# Patient Record
Sex: FEMALE | Race: WHITE | NOT HISPANIC OR LATINO | Employment: PART TIME | URBAN - METROPOLITAN AREA
[De-identification: names, ages, dates, MRNs, and addresses within clinical notes are randomized per-mention and may not be internally consistent; named-entity substitution may affect disease eponyms.]

---

## 2020-07-17 ENCOUNTER — TRANSFERRED RECORDS (OUTPATIENT)
Dept: HEALTH INFORMATION MANAGEMENT | Facility: CLINIC | Age: 25
End: 2020-07-17

## 2020-07-17 LAB
C TRACH DNA SPEC QL PROBE+SIG AMP: NOT DETECTED
HIV 1&2 EXT: NORMAL
N GONORRHOEA DNA SPEC QL PROBE+SIG AMP: NOT DETECTED
SPECIMEN DESCRIP: NORMAL
SPECIMEN DESCRIPTION: NORMAL

## 2021-05-13 ENCOUNTER — OFFICE VISIT (OUTPATIENT)
Dept: INTERNAL MEDICINE | Facility: CLINIC | Age: 26
End: 2021-05-13
Payer: COMMERCIAL

## 2021-05-13 VITALS
HEIGHT: 64 IN | DIASTOLIC BLOOD PRESSURE: 76 MMHG | TEMPERATURE: 98.1 F | BODY MASS INDEX: 24.19 KG/M2 | SYSTOLIC BLOOD PRESSURE: 118 MMHG | HEART RATE: 114 BPM | OXYGEN SATURATION: 96 % | RESPIRATION RATE: 22 BRPM | WEIGHT: 141.7 LBS

## 2021-05-13 DIAGNOSIS — G40.909 SEIZURE DISORDER (H): ICD-10-CM

## 2021-05-13 DIAGNOSIS — K21.9 GASTROESOPHAGEAL REFLUX DISEASE WITHOUT ESOPHAGITIS: ICD-10-CM

## 2021-05-13 DIAGNOSIS — Z79.899 CONTROLLED SUBSTANCE AGREEMENT SIGNED: ICD-10-CM

## 2021-05-13 DIAGNOSIS — F90.0 ATTENTION DEFICIT HYPERACTIVITY DISORDER (ADHD), PREDOMINANTLY INATTENTIVE TYPE: Primary | ICD-10-CM

## 2021-05-13 PROCEDURE — 80307 DRUG TEST PRSMV CHEM ANLYZR: CPT | Performed by: NURSE PRACTITIONER

## 2021-05-13 PROCEDURE — 99204 OFFICE O/P NEW MOD 45 MIN: CPT | Performed by: NURSE PRACTITIONER

## 2021-05-13 RX ORDER — ATOMOXETINE 25 MG/1
25 CAPSULE ORAL DAILY
COMMUNITY
Start: 2021-05-12 | End: 2021-09-20

## 2021-05-13 RX ORDER — LAMOTRIGINE 100 MG/1
100 TABLET ORAL 2 TIMES DAILY
COMMUNITY
Start: 2021-03-18 | End: 2021-12-02

## 2021-05-13 RX ORDER — METHYLPHENIDATE HYDROCHLORIDE 18 MG/1
TABLET ORAL
COMMUNITY
Start: 2021-04-05 | End: 2021-05-13

## 2021-05-13 RX ORDER — METHYLPHENIDATE HYDROCHLORIDE 18 MG/1
TABLET ORAL
Qty: 60 TABLET | Refills: 0 | Status: SHIPPED | OUTPATIENT
Start: 2021-05-13 | End: 2021-08-20

## 2021-05-13 SDOH — HEALTH STABILITY: MENTAL HEALTH: HOW OFTEN DO YOU HAVE 6 OR MORE DRINKS ON ONE OCCASION?: NEVER

## 2021-05-13 SDOH — HEALTH STABILITY: MENTAL HEALTH: HOW OFTEN DO YOU HAVE A DRINK CONTAINING ALCOHOL?: MONTHLY OR LESS

## 2021-05-13 SDOH — HEALTH STABILITY: MENTAL HEALTH: HOW MANY STANDARD DRINKS CONTAINING ALCOHOL DO YOU HAVE ON A TYPICAL DAY?: 1 OR 2

## 2021-05-13 ASSESSMENT — MIFFLIN-ST. JEOR: SCORE: 1372.75

## 2021-05-13 NOTE — PROGRESS NOTES
Assessment & Plan     Attention deficit hyperactivity disorder (ADHD), predominantly inattentive type  - Drug Confirmation Panel Urine with Creat (Care Map)  - methylphenidate HCl ER (CONCERTA) 18 MG CR tablet; TAKE 2 TABLETS BY MOUTH IN THE MORNING  - MENTAL HEALTH REFERRAL  - Adult; Psychiatry, Outpatient Treatment; Individual/Couples/Family/Group Therapy/Health Psychology; [facility]: Psychiatry Clinic [phone]; We will contact you to schedule the appointment or please call with any questions;...; Future  - also on Strattera    Controlled substance agreement signed  - Drug Confirmation Panel Urine with Creat (Care Map)  - methylphenidate HCl ER (CONCERTA) 18 MG CR tablet; TAKE 2 TABLETS BY MOUTH IN THE MORNING  - MENTAL HEALTH REFERRAL  - Adult; Psychiatry, Outpatient Treatment; Individual/Couples/Family/Group Therapy/Health Psychology; [facility]: Psychiatry Clinic [phone]; We will contact you to schedule the appointment or please call with any questions;...; Future    Seizure disorder (H)  - followed by Neurologist and on Lamictal  - MENTAL HEALTH REFERRAL  - Adult; Psychiatry, Outpatient Treatment; Individual/Couples/Family/Group Therapy/Health Psychology; [facility]: Psychiatry Clinic [phone]; We will contact you to schedule the appointment or please call with any questions;...; Future    Gastroesophageal reflux disease without esophagitis  - stable and taking Omeprazole prn; encouraged to trial over the counter Pepcid twice daily in place (Omeprazole must be taken routinely/daily    [id number]}     Patient Instructions   Go to [address] for urine lab    Refilled Concerta today; MUST get a copy of the medical records to include testing for ADHD    Referral to Mental Health psychiatrist and counselor    Signed controlled substance agreement (copy given to patient)    Follow up in 2 months for physical to include a pap and fasting blood work      Return in about 2 months (around 7/13/2021) for Routine preventive,  "with me, in person to include pap and fasting blood work.    Sol Talbot CNP  [facility] LORRIE Medrano is a 26 year old who presents for the following health issues  accompanied by her mother:    HPI     She wants to establish care with a primary provider and also need refill on her medications.     New patient to the clinic and provider.  Briefly reviewed PMH, SH, FH, and medications.  She moved up from [place], [place] with her mother and trying to establish with a provider for medication refills: today needs Concerta filled so she can continue to work; helps her focus and concentrate and has been without it for several days.  The clinic in [place] would not refill it due to her provider retiring.  Wants to see a psychiatrist and counselor to make sure she is on the correct medications.  Accompanied by mom.    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    Overall doing ok.  No fever or cough.  No shortness of breathe or chest pain.  No stomach or urination issues.  No joint pain.      Review of Systems   Constitutional, HEENT, cardiovascular, pulmonary, GI, , musculoskeletal, neuro, skin, endocrine and psych systems are negative, except as otherwise noted.      Objective    /76 (BP Location: Left arm, Patient Position: Sitting, Cuff Size: Adult Regular)   Pulse 114   Temp 98.1  F (36.7  C) (Oral)   Resp 22   Ht 1.626 m (5' 4\")   Wt 64.3 kg (141 lb 11.2 oz)   LMP 05/07/2021 (Exact Date)   SpO2 96%   BMI 24.32 kg/m    Body mass index is 24.32 kg/m .     Physical Exam   GENERAL: alert and no distress; accompanied by mom  RESP: lungs clear to auscultation - no rales, rhonchi or wheezes  CV: regular rate and rhythm, normal S1 S2, no S3 or S4, no murmur, click or rub, no peripheral edema and peripheral pulses strong  ABDOMEN: soft, nontender, no hepatosplenomegaly, no masses and bowel sounds normal  SKIN: no suspicious " lesions or rashes  PSYCH: mentation appears normal, affect normal and anxious

## 2021-05-13 NOTE — PATIENT INSTRUCTIONS
Go to [address] for urine lab    Refilled Concerta today; MUST get a copy of the medical records to include testing for ADHD    Referral to Mental Health psychiatrist and counselor    Signed controlled substance agreement (copy given to patient)    Follow up in 2 months for physical to include a pap and fasting blood work  
none

## 2021-05-14 ENCOUNTER — TRANSFERRED RECORDS (OUTPATIENT)
Dept: HEALTH INFORMATION MANAGEMENT | Facility: CLINIC | Age: 26
End: 2021-05-14

## 2021-05-18 LAB
AMPHET UR CFM-MCNC: 1960 NG/ML
AMPHET/CREAT UR: 961 NG/MG{CREAT}
CREAT UR-MCNC: 204 MG/DL
ME-PHENIDATE UR CFM-MCNC: 395 NG/ML
ME-PHENIDATE/CREAT UR: 194 NG/MG{CREAT}
RPT COMMENT: ABNORMAL

## 2021-05-19 ENCOUNTER — TELEPHONE (OUTPATIENT)
Dept: INTERNAL MEDICINE | Facility: CLINIC | Age: 26
End: 2021-05-19

## 2021-05-19 DIAGNOSIS — F90.0 ATTENTION DEFICIT HYPERACTIVITY DISORDER (ADHD), PREDOMINANTLY INATTENTIVE TYPE: Primary | ICD-10-CM

## 2021-05-19 NOTE — TELEPHONE ENCOUNTER
[facility].  See below  Kendra Flores RN, BSN        ----- Message from Sol Talbot CNP sent at 5/18/2021  8:41 PM CDT -----  Notify the patient.  Drug screen testing shows positive for Amphetamine and Methylphenidate which is what you are taking (shows high doses).  Please keep referral appointment with mental health specialist for further evaluation as that specialist will need to approve any future refills..  Still need medical documentation from previous provider for your diagnosis for ADHD.

## 2021-05-24 NOTE — TELEPHONE ENCOUNTER
Contacted patient and informed her of lab results and Shanique's recommendations. Patient states that the referral Shanique gave her to [facility] did not work out and they were not able to see her because they are too busy. She will need a new referral, she is also concerned about running out of medication before she can see a new psychiatrist.

## 2021-05-24 NOTE — TELEPHONE ENCOUNTER
I signed the referral for another psychiatry group for further evaluation of her ADHD medications (Strattera and Concerta).  Notify the patient.

## 2021-05-24 NOTE — TELEPHONE ENCOUNTER
Shanique requests this RN contact [facility] to clarify why patient was not able to be seen for an appointment.     Contacted [facility] ([phone]) and spoke to Myriam. She states they according to their notes they spoke to patient on 5/21 and the patient was going to call back to complete their phone screening. However, wait times are 5 months out for their department and they do tell patients this when they call (perhaps this is what patient was referring to). She states once the patient completes the phone screening, they would get her added to their wait list for cancellations to try to get them in sooner, but this is not guaranteed.      She states the Collaborative Care Service line usually has slightly shorter wait times of 3 months - this is a different option on the referral. Psychiatry referral pended for Collaborative Care if desired.

## 2021-05-25 NOTE — TELEPHONE ENCOUNTER
Attempted to contact patient. Left voice message to call back.     Please inform patient that Shanique placed different referral for her through [facility]. They will contact her to schedule an appointment.

## 2021-07-23 ENCOUNTER — VIRTUAL VISIT (OUTPATIENT)
Dept: PSYCHIATRY | Facility: CLINIC | Age: 26
End: 2021-07-23
Attending: NURSE PRACTITIONER
Payer: COMMERCIAL

## 2021-07-23 DIAGNOSIS — Z53.9 NO SHOW: Primary | ICD-10-CM

## 2021-07-23 NOTE — Clinical Note
Tatianna, in meeting with Nellie today she appeared quite suspicious.  She evaded answering certain personal questions regarding her background.  At this point I am going to continue her Concerta and atomoxetine as prescribed.  I hope to get additional information from her mother at some point as well as through other records.  We talked about decreasing her dose of Concerta in the future to see if this would decrease the intensity of her irritability.  Thank you for the referral.  Gely

## 2021-07-23 NOTE — PROGRESS NOTES
This video/telephone visit will be conducted via a call between you and your physician/provider. We have found that certain health care needs can be provided without the need for an in-person physical exam. This service lets us provide the care you need with a video /telephone conversation. If a prescription is necessary we can send it directly to your pharmacy. If lab work is needed we can place an order for that and you can then stop by our lab to have the test done at a later time.    Just as we bill insurance for in-person visits, we also bill insurance for video/telephone visits. If you have questions about your insurance coverage, we recommend that you speak with your insurance company.    Patient has given verbal consent for video/Telephone visit? Yes   Patient would like the video visit invitation sent by: Text to cell phone: [phone]  CONNIE/GENESIS NI CMA    Referral by: Sol Talbot CNP     Patient verified allergies, medications and pharmacy via phone. PHQ: 10 Not difficult and JAISON: 11 somewhat  done verbally with writer.   Patient states she is ready for visit.  [place]  to be [place] by provider.

## 2021-07-23 NOTE — PROGRESS NOTES
________________________________________  Medications Phoned  to Pharmacy [] yes []no  Name of Pharmacist:  List Medications, including dose, quantity and instructions    Medications ordered this visit were e-scribed.  Verified by order class [] yes  [x] no    Medication changes or discontinuations were communicated to patient's pharmacy: [] yes  [x] no    Dictation completed at time of chart check: [] yes  [x] no    I have checked the documentation for today s encounters and the above information has been reviewed and completed.

## 2021-07-23 NOTE — PATIENT INSTRUCTIONS
1.  Continue Concerta 36 mg daily with thoughts to decrease dosage dose at her next visit to minimize anxiety and irritability symptoms  2.  Continue atomoxetine 25 mg daily  3.  Can consider psychological testing to confirm diagnosis of ADHD       Continue all other medical directions per primary care provider.     Continue all other medications as reviewed per electronic medical record today.     Safety plan reviewed. To the Emergency Department as needed or call after hours crisis line at [phone] or [phone]. [place] Crisis Text Line: Text [place] to 687392  or  Suicide LifeLine Chat: suicideCompuMed.org/chat/    To schedule individual or family therapy, call [facility] at [phone].     Schedule an appointment with me in September 20 or sooner as needed.  Call [facility] at [phone] to schedule.    Follow up with primary care provider as planned or for acute medical concerns.    Call the psychiatric nurse line with medication questions or concerns at [phone].    RentHome.ruhart may be used to communicate with your provider, but this is not intended to be used for emergencies.     Crisis Resources:    National Suicide Prevention Lifeline: 373.330.6459 (TTY: 230.299.1799). Call anytime for help.  (www.suicidepreventionlifeline.org)  National [place] [employer] Mental Illness (www.dante.org): 529.382.3433 or 596-357-4779.   Mental Health Association (www.mentalhealth.org): 978.857.3770 or 986-487-3789.  [place] Crisis Text Line: Text [place] to 912475  Suicide LifeLine Chat: suicideCompuMed.org/chat

## 2021-08-16 ENCOUNTER — TELEPHONE (OUTPATIENT)
Dept: INTERNAL MEDICINE | Facility: CLINIC | Age: 26
End: 2021-08-16

## 2021-08-16 NOTE — TELEPHONE ENCOUNTER
Call received from patient. States she has been out of her Methylphenidate for 4 days. Patient has upcoming appointment 8/19 with Sol Talbot. Asking if there is anything she can do to help her concentrate until her appointment. Patient did have virtual visit with psychiatry on 7/23. Per 5/18 result note from Sol Alex CNP   5/18/2021  8:41 PM CDT       Notify the patient.  Drug screen testing shows positive for Amphetamine and Methylphenidate which is what you are taking (shows high doses).  Please keep referral appointment with mental health specialist for further evaluation as that specialist will need to approve any future refills..  Still need medical documentation from previous provider for your diagnosis for ADHD.     Patient states she is not sure what the plan was with psychiatry as far as further refills. States she is not sure if they were going to prescribe medications or if they were going to call her or what. Per psychiatry virtual visit note advised to    Call the psychiatric nurse line with medication questions or concerns at [phone].

## 2021-08-19 ENCOUNTER — VIRTUAL VISIT (OUTPATIENT)
Dept: INTERNAL MEDICINE | Facility: CLINIC | Age: 26
End: 2021-08-19
Payer: COMMERCIAL

## 2021-08-19 ENCOUNTER — TELEPHONE (OUTPATIENT)
Dept: PSYCHIATRY | Facility: CLINIC | Age: 26
End: 2021-08-19

## 2021-08-19 DIAGNOSIS — F90.0 ATTENTION DEFICIT HYPERACTIVITY DISORDER (ADHD), PREDOMINANTLY INATTENTIVE TYPE: ICD-10-CM

## 2021-08-19 DIAGNOSIS — Z79.899 CONTROLLED SUBSTANCE AGREEMENT SIGNED: ICD-10-CM

## 2021-08-19 PROCEDURE — 99213 OFFICE O/P EST LOW 20 MIN: CPT | Mod: GT | Performed by: NURSE PRACTITIONER

## 2021-08-19 RX ORDER — ATOMOXETINE 25 MG/1
25 CAPSULE ORAL DAILY
Status: CANCELLED | OUTPATIENT
Start: 2021-08-19

## 2021-08-19 RX ORDER — METHYLPHENIDATE HYDROCHLORIDE 18 MG/1
TABLET ORAL
Qty: 60 TABLET | Refills: 0 | Status: CANCELLED | OUTPATIENT
Start: 2021-08-19

## 2021-08-19 NOTE — PATIENT INSTRUCTIONS
Attention deficit hyperactivity disorder (ADHD), predominantly inattentive type  - referral was made to psychiatry and she saw provider:  ANGELICA Salas on 7/23/2021  - given number to patient for psychiatric nurse line with medication questions or concerns at [phone] to get refill and to schedule the upcoming appointment.  - I told her that once she is stabilized and referred back to me that then I would refill her medication but until then she needs to work with this provider

## 2021-08-19 NOTE — PROGRESS NOTES
Marcela is a 26 year old who is being evaluated via a billable video visit.      How would you like to obtain your AVS? Mail a copy  If the video visit is dropped, the invitation should be resent by: Text to cell phone: [phone]  Will anyone else be joining your video visit? No      Video Start Time: 5:14 PM    Assessment & Plan     Attention deficit hyperactivity disorder (ADHD), predominantly inattentive type  - referral was made to psychiatry and she saw provider:  ANGELICA Salas on 7/23/2021  - given number to patient for psychiatric nurse line with medication questions or concerns at [phone] to get refill and to schedule the upcoming appointment.  - I told her that once she is stabilized and referred back to me that then I would refill her medication but until then she needs to work with this provider; she seemed to understand      0956}     Patient instructions:  See above.    Return in about 4 weeks (around 9/16/2021), or if symptoms worsen or fail to improve.    Sol Talbot [facility]   Marcela is a 26 year old who presents for the following health issues     HPI     Medication Followup of Concerta 18 MG    Taking Medication as prescribed: yes    Side Effects:  None    Medication Helping Symptoms:  yes     She has been out of this medication for a week and without the medication she is not doing well. She needs a refill.    See above.  Patient was referred to Psychiatry and counseling for further evaluation of her ADHD.  Was seen by ANGELICA Salas on 7/23/2021 and reviewed note.  Patient was to follow up in 2 months with them until medications are stabilized.    Poor concentration and focus since running out of medication.  States she could not find there phone # to reach out to request refills or schedule follow up appointment.    Review of Systems   Constitutional, HEENT, cardiovascular, pulmonary, gi and gu systems are negative,  except as otherwise noted.      Objective           Vitals:  No vitals were obtained today due to virtual visit.    Physical Exam   GENERAL: alert and no distress  EYES: Eyes grossly normal to inspection.  No discharge or erythema, or obvious scleral/conjunctival abnormalities.  RESP: No audible wheeze, cough, or visible cyanosis.  No visible retractions or increased work of breathing.    SKIN: Visible skin clear. No significant rash, abnormal pigmentation or lesions.  NEURO: Cranial nerves grossly intact.  Mentation and speech appropriate for age.  PSYCH: Affect: poor focus and concentration; had to repeat many times the provider's name and number of psychiatry she was to reach out to.            Video-Visit Details    Type of service:  Video Visit    Video End Time:5:27 PM    Originating Location (pt. Location): Home    Distant Location (provider location):  [facility]     Platform used for Video Visit: Christi

## 2021-08-20 RX ORDER — METHYLPHENIDATE HYDROCHLORIDE 18 MG/1
TABLET ORAL
Qty: 60 TABLET | Refills: 0 | Status: SHIPPED | OUTPATIENT
Start: 2021-08-20 | End: 2021-09-20

## 2021-08-20 NOTE — TELEPHONE ENCOUNTER
Date of Last Office Visit: 07/23/2021  Date of Next Office Visit: 09/20/2021  No shows since last visit: 0  Cancellations since last visit: 0    Medication requested: methylphenidate HCL ER (Concerta) 18mg  Date last ordered: 05/13/2021 Qty: 60 Refills: 0     Review of [place] ?: no - unable to access provider as supervisor delegate not approved yet.  Medication last filled date: 05/13/2021 Qty filled: 05/13/2021  Other controlled substance on [place] ?: unknown  If yes, is this a new medication?: n/a  If yes, name of medication: n/a and date filled: n/a    Lapse in medication adherence greater than 5 days?: yes based on date of last order  If yes, call patient and gather details: called pt  Medication refill request verified as identical to current order?: yes  Result of Last DAM, VPA, Li+ Level, CBC, or Carbamazepine Level (at or since last visit): N/A    []Medication refilled per  Medication Refill in Ambulatory Care  policy.  [x]Medication unable to be refilled by RN due to criteria not met as indicated below:    []Eligibility - not seen in the last year   []Supervision - no future appointment   []Compliance - no shows, cancellations or lapse in therapy   []Verification - order discrepancy   [x]Controlled medication   []Medication not included in policy   []90-day supply request   []Other    Provider Note from 7/23/2021 apt:  1.  Continue Concerta 36 mg daily with thoughts to decrease dosage dose at her next visit to minimize anxiety and irritability symptoms

## 2021-08-20 NOTE — TELEPHONE ENCOUNTER
Reason for call:  Medication     If this is a refill request, has the caller requested the refill from the pharmacy already? Yes     Will the patient be using a [facility]? No     Name of the pharmacy and phone number for the current request:   [id number] - [place], [place] - [address] [place] [address] [address] AT [facility] [address] [facility] [address]  [phone]    Name of the medication requested: Concerta    Other request: Pt states that she hasn't had the med for 2 weeks because she couldn't reach us. (okay?) Pt has a follow-up appt scheduled on 9/20 Provided our direct number.     Phone number to reach patient:  Home number on file [phone] (home)    Best Time:  ASAP    Can we leave a detailed message on this number?  YES    Travel screening: Not Applicable

## 2021-08-20 NOTE — TELEPHONE ENCOUNTER
This RN called pt to update that the provider sent the order to [facility] in [place] this morning. She verbalized understanding.    Al Hutson RN on 8/20/2021 at 10:28 AM

## 2021-10-22 ENCOUNTER — VIRTUAL VISIT (OUTPATIENT)
Dept: PSYCHIATRY | Facility: CLINIC | Age: 26
End: 2021-10-22
Payer: COMMERCIAL

## 2021-10-22 DIAGNOSIS — F31.61 BIPOLAR DISORDER, CURRENT EPISODE MIXED, MILD (H): Primary | ICD-10-CM

## 2021-10-22 DIAGNOSIS — F90.0 ATTENTION DEFICIT HYPERACTIVITY DISORDER (ADHD), PREDOMINANTLY INATTENTIVE TYPE: ICD-10-CM

## 2021-10-22 DIAGNOSIS — F41.1 GAD (GENERALIZED ANXIETY DISORDER): ICD-10-CM

## 2021-10-22 PROCEDURE — 99207 PR CDG-CODE INCORRECT PER BILLING BASED ON TIME: CPT | Performed by: NURSE PRACTITIONER

## 2021-10-22 PROCEDURE — 99215 OFFICE O/P EST HI 40 MIN: CPT | Mod: TEL | Performed by: NURSE PRACTITIONER

## 2021-10-22 RX ORDER — METHYLPHENIDATE HYDROCHLORIDE 18 MG/1
TABLET ORAL
Qty: 60 TABLET | Refills: 0 | Status: SHIPPED | OUTPATIENT
Start: 2021-10-22 | End: 2021-11-24

## 2021-10-22 NOTE — PROGRESS NOTES
"Marcela is a 26 year old who is being evaluated via a billable telephone visit.      What phone number would you like to be contacted at? [phone]  How would you like to obtain your AVS? Olga  Phone call duration: 30 minutes        Outpatient Psychiatric Progress Note    Name: Marcela Harris   : 1995                    Primary Care Provider: Sol Talbot CNP   Therapist: None    PHQ-9 scores:  PHQ-9 SCORE 2021   PHQ-9 Total Score 10 3       JAISON-7 scores:  JAISON-7 SCORE 2021   Total Score 11 1       Patient Identification:    Patient is a 26 year old year old, single  White  female  who presents for return visit with me.  Patient is currently employed part time. Patient attended the session alone. Patient prefers to be called: \" Loreta\".      Current medications include: atomoxetine (STRATTERA) 25 MG capsule, Take 1 capsule (25 mg) by mouth daily  etonogestrel (NEXPLANON) 68 MG IMPL, 1 each by Subdermal route once  lamoTRIgine (LAMICTAL) 100 MG tablet, Take 100 mg by mouth 2 times daily  methylphenidate HCl ER (CONCERTA) 18 MG CR tablet, TAKE 2 TABLETS BY MOUTH IN THE MORNING - Fill 28 days after last refill    No current facility-administered medications on file prior to visit.       The [place] Prescription Monitoring Program has been reviewed and there are no concerns about diversionary activity for controlled substances at this time.      I was able to review most recent Primary Care Provider, specialty provider, and therapy visit notes that I have access to.     Today, patient reports that she is at work.. 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  She feels better at organizing.   She has found ways to manage her anxiety.  She denies having a lot of depression thoughts.  Yesterday s he ran out of her  Strattera and feels like that the medication  Is more helpful in managing her behavior to goof off less at work.  She " denies anger outbursts and feels like the episodes are not as bad as in the past.  She has not been fighting with her parents.  When she took two  Atomoxetine unintentionally, she felt her heart racing.  She did a hypnosis on her phone to get her on a schedule.  She recently moved to [place] and has no PCP established yet.     has a past medical history of Attention deficit hyperactivity disorder (ADHD), predominantly inattentive type, Contraceptive management, Gastroesophageal reflux disease without esophagitis, and Seizure disorder (H) (2000).    Social history updates:    She lives with her parents and her two sisters.  She is planning to see her grandmother this weekend.   She is starting read again.  Marcela likes to color.      Substance use updates:    No alcohol use reported.  Tobacco use: No    Vital Signs:   There were no vitals taken for this visit.    Labs:    Most recent laboratory results reviewed and no new labs.     Review of Systems:  10 systems (general, cardiovascular, respiratory, eyes, ENT, endocrine, GI, , M/S, neurological) were reviewed. Most pertinent finding(s) is/are: no chest pain reported, no stomach pain as long as she eats food with her medication, no headaches. The remaining systems are all unremarkable.    Mental Status Examination:  Appearance:  awake, alert  Attitude:  cooperative   Eye Contact:  unable to assess  Gait and Station: No assistive Devices used and No dizziness or falls  Psychomotor Behavior:  unable to assess  Oriented to:  time, person, and place  Attention Span and Concentration:  Normal  Speech:   clear, coherent and Speaks: English  Mood:  anxious and depressed  Affect:  mood congruent  Associations:  no loose associations  Thought Process:  goal oriented  Thought Content:  no evidence of suicidal ideation or homicidal ideation, no auditory hallucinations present and no visual hallucinations present  Recent and Remote Memory:  intact Not formally assessed. No  amnesia.  Fund of Knowledge: appropriate  Insight:  good  Judgment:  intact  Impulse Control:  intact    Suicide Risk Assessment:  Today Marcela Harris reports that she is having no thoughts to want to end her life or to harm other people. In addition, there are notable risk factors for self-harm, including single status, anxiety and mood change. However, risk is mitigated by commitment to family, history of seeking help when needed, future oriented, denies suicidal intent or plan and denies homicidal ideation, intent, or plan. Therefore, based on all available evidence including the factors cited above, Marcela Harris does not appear to be at imminent risk for self-harm, does not meet criteria for a 72-hr hold, and therefore remains appropriate for ongoing outpatient level of care.  A thorough assessment of risk factors related to suicide and self-harm have been reviewed and are noted above. The patient convincingly denies suicidality on several occasions. Local community safety resources printed and reviewed for patient to use if needed. There was no deceit detected, and the patient presented in a manner that was believable.     DSM5 Diagnosis:  Attention-Deficit/Hyperactivity Disorder  314.00 (F90.0) Predominantly inattentive presentation  296.89 Bipolar II Disorder With mixed features and mild  300.02 (F41.1) Generalized Anxiety Disorder    Medical comorbidities include:   Patient Active Problem List    Diagnosis Date Noted     Attention deficit hyperactivity disorder (ADHD), predominantly inattentive type      Priority: Medium     Gastroesophageal reflux disease without esophagitis      Priority: Medium     Seizure disorder (H) 2000     Priority: Medium     TBI with brain tumor         Assessment:    Marcela Harris reported to me that 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  The Concerta is working well for her in addition to the Populis.  She desires no changes in these today.   Anxiety is manageable and her depression is manageable.  With regards to mood regulation, she feels more stable with less anger outbursts..  She reported no side effects from her medications such as tachycardia or chest pain.    Medication side effects and alternatives were reviewed. Health promotion activities recommended and reviewed today. All questions addressed. Education and counseling completed regarding risks and benefits of medications completed.    Treatment Plan:        1.  Continue Concerta 36 mg daily    2.  Continue Strattera 25 mg daily        Continue all other medications as reviewed per electronic medical record today.     Safety plan reviewed. To the Emergency Department as needed or call after hours crisis line at [phone] or [phone]. [place] Crisis [place]. Text [place] to 586041 or Suicide LifeLine Chat: [email or web address]/chat/    To schedule individual or family therapy, call [facility] at [phone].    Schedule an appointment with me in November 29 or sooner as needed. Call [facility] at [phone] to schedule.    Follow up with primary care provider as planned or for acute medical concerns.    Call the psychiatric nurse line with medication questions or concerns at [phone].    Ubalohart may be used to communicate with your provider, but this is not intended to be used for emergencies.    Crisis Resources:    National Suicide Prevention Lifeline: 525.674.6048 (TTY: 122.534.3684). Call anytime for help.  (www.suicidepreventionlifeline.org)  National [place] on Mental Illness (www.dante.org): 856.262.4835 or 605-609-8566.   Mental Health Association (www.mentalhealth.org): 198.253.8722 or 058-957-9059.  [place] Crisis [place]: Text [place] to 829460  Suicide LifeLine Chat: [email or web address]/chat    Administrative Billing:   Time spent with patient was 30 minutes and greater than 50% of time or 20 minutes was spent in  counseling and coordination of care regarding above diagnoses and treatment plan.     psychotherapy options.    Treatment Plan:    Patient Status:  Patient will continue to be seen for ongoing consultation and stabilization.    Signed:   BIJU Rebolledo-BC   Psychiatry

## 2021-10-22 NOTE — PATIENT INSTRUCTIONS
1.  Continue Concerta 36 mg daily    2.  Continue Strattera 25 mg daily        Continue all other medications as reviewed per electronic medical record today.     Safety plan reviewed. To the Emergency Department as needed or call after hours crisis line at [phone] or [phone]. [place] Crisis [place]. Text [place] to 859125 or Suicide LifeLine Chat: [email or web address]/chat/    To schedule individual or family therapy, call [facility] at [phone].    Schedule an appointment with me in November 29 or sooner as needed. Call [facility] at [phone] to schedule.    Follow up with primary care provider as planned or for acute medical concerns.    Call the psychiatric nurse line with medication questions or concerns at [phone].    [email or web address] may be used to communicate with your provider, but this is not intended to be used for emergencies.    Crisis Resources:    National Suicide Prevention Lifeline: 979.133.7950 (TTY: 576.722.7015). Call anytime for help.  (www.suicidepreventionlifeline.org)  National [place] on Mental Illness (www.dante.org): 954.596.5091 or 459-221-0237.   Mental Health Association (www.mentalhealth.org): 174.332.4988 or 785-919-4495.  [place] Crisis [place]: Text [place] to 646209  Suicide LifeLine Chat: suicidePharmRight Corp.org/chat

## 2021-12-02 ENCOUNTER — VIRTUAL VISIT (OUTPATIENT)
Dept: PSYCHIATRY | Facility: CLINIC | Age: 26
End: 2021-12-02
Payer: COMMERCIAL

## 2021-12-02 DIAGNOSIS — F31.61 BIPOLAR DISORDER, CURRENT EPISODE MIXED, MILD (H): Primary | ICD-10-CM

## 2021-12-02 DIAGNOSIS — F90.0 ATTENTION DEFICIT HYPERACTIVITY DISORDER (ADHD), PREDOMINANTLY INATTENTIVE TYPE: ICD-10-CM

## 2021-12-02 PROCEDURE — 99214 OFFICE O/P EST MOD 30 MIN: CPT | Mod: TEL | Performed by: NURSE PRACTITIONER

## 2021-12-02 RX ORDER — LAMOTRIGINE 100 MG/1
100 TABLET ORAL 2 TIMES DAILY
Qty: 30 TABLET | Refills: 3 | Status: SHIPPED | OUTPATIENT
Start: 2021-12-02 | End: 2022-08-10

## 2021-12-02 RX ORDER — METHYLPHENIDATE HYDROCHLORIDE 18 MG/1
TABLET ORAL
Qty: 60 TABLET | Refills: 0 | Status: SHIPPED | OUTPATIENT
Start: 2021-12-02 | End: 2022-01-06

## 2021-12-02 RX ORDER — ATOMOXETINE 25 MG/1
25 CAPSULE ORAL DAILY
Qty: 30 CAPSULE | Refills: 3 | Status: SHIPPED | OUTPATIENT
Start: 2021-12-02 | End: 2022-01-06

## 2021-12-02 NOTE — PROGRESS NOTES
"Marcela is a 26 year old who is being evaluated via a billable telephone visit.      What phone number would you like to be contacted at? [phone]  How would you like to obtain your AVS? Mail a copy        Outpatient Psychiatric Progress Note    Name: Marcela Harris   : 1995                    Primary Care Provider: Sol Talbot CNP   Therapist: None    PHQ-9 scores:  PHQ-9 SCORE 2021   PHQ-9 Total Score 10 3       JAISON-7 scores:  JAISON-7 SCORE 2021   Total Score 11 1       Patient Identification:    Patient is a 27 year old year old, single  White  female  who presents for return visit with me.  Patient is currently employed part time. Patient attended the session alone. Patient prefers to be called: \" Loreta\".    Current medications include: etonogestrel (NEXPLANON) 68 MG IMPL, 1 each by Subdermal route once    No current facility-administered medications on file prior to visit.       The [place] Prescription Monitoring Program has been reviewed and there are no concerns about diversionary activity for controlled substances at this time.      I was able to review most recent Primary Care Provider, specialty provider, and therapy visit notes that I have access to.     Today, patient reports that she has been off of her medications for 2 weeks. Since that time she has been experiencing mood swings ranging from extreme happiness to extreme sadness. People are getting upset with her both at work and at home. Throughout all this her sleep is going well. Other mood swing behaviors include extreme negative thinking. At this point she is taking atomoxetine, methylphenidate, and Lamictal. Yesterday she told me she lost her bottle of Lamictal.     has a past medical history of Attention deficit hyperactivity disorder (ADHD), predominantly inattentive type, Contraceptive management, Gastroesophageal reflux disease without esophagitis, and Seizure disorder (H) ().    Social " history updates:    Loreta lives with family. She has financial stress.    Substance use updates:    No alcohol use reported  Tobacco use: No    Vital Signs:   There were no vitals taken for this visit.    Labs:    Most recent laboratory results reviewed and no new labs.     Mental Status Examination:  Appearance:  awake, alert and mild distress  Attitude:  cooperative   Eye Contact:  Unable to assess  Gait and Station: No assistive Devices used and No dizziness or falls  Psychomotor Behavior:  Unable to assess  Oriented to:  time, person, and place  Attention Span and Concentration:  Fair  Speech:   pressured speech and Speaks: English  Mood:  anxious  Affect:  intensity is heightened  Associations:  no loose associations  Thought Process:  goal oriented  Thought Content:  no evidence of suicidal ideation or homicidal ideation, no auditory hallucinations present and no visual hallucinations present  Recent and Remote Memory:  intact Not formally assessed. No amnesia.  Fund of Knowledge: appropriate  Insight:  good  Judgment:  intact  Impulse Control:  fair    Suicide Risk Assessment:  Today Marcela Harris reports that she is having no thoughts to want to end her life or to harm other people. In addition, there are notable risk factors for self-harm, including single status, anxiety and mood change. However, risk is mitigated by commitment to family, history of seeking help when needed, future oriented, denies suicidal intent or plan and denies homicidal ideation, intent, or plan. Therefore, based on all available evidence including the factors cited above, Marcela Harris does not appear to be at imminent risk for self-harm, does not meet criteria for a 72-hr hold, and therefore remains appropriate for ongoing outpatient level of care.  A thorough assessment of risk factors related to suicide and self-harm have been reviewed and are noted above. The patient convincingly denies suicidality on several occasions. Local  community safety resources printed and reviewed for patient to use if needed. There was no deceit detected, and the patient presented in a manner that was believable.     DSM5 Diagnosis:  Attention-Deficit/Hyperactivity Disorder  314.00 (F90.0) Predominantly inattentive presentation  296.89 Bipolar II Disorder With mixed features and moderate    Medical comorbidities include:   Patient Active Problem List    Diagnosis Date Noted     Attention deficit hyperactivity disorder (ADHD), predominantly inattentive type      Priority: Medium     Gastroesophageal reflux disease without esophagitis      Priority: Medium     Seizure disorder (H) 2000     Priority: Medium     TBI with brain tumor         Assessment:    Marcela Harris reported being off of her medications for the past 2 weeks. Since that time she has experienced extreme mood swings from extreme happiness to extreme sadness and negative thinking. People around her at home and at work have noticed the change in her demeanor.. Lamictal will continue at 100 mg twice daily. She has not missed any doses of that, according to her as she just misplaced the bottle yesterday. Atomoxetine will be restarted at 25 mg daily. Concerta will be restarted at 36 mg daily    Medication side effects and alternatives were reviewed. Health promotion activities recommended and reviewed today. All questions addressed. Education and counseling completed regarding risks and benefits of medications and psychotherapy options.    Treatment Plan:  [email or web address]        1. Lamotrigine 100 mg twice daily    2. Concerta 36 mg daily    3. Atomoxetine 25 mg daily        Continue all other medications as reviewed per electronic medical record today.     Safety plan reviewed. To the Emergency Department as needed or call after hours crisis line at [phone] or [phone]. [place] Crisis Text [place]. Text [place] to 673264 or Suicide LifeLine Chat: suicidepreventionlifeline.org/chat/    To  schedule individual or family therapy, call [facility] at [phone].    Schedule an appointment with me in 6 weeks or sooner as needed. Call [facility] at [phone] to schedule.    Follow up with primary care provider as planned or for acute medical concerns.    Call the psychiatric nurse line with medication questions or concerns at [phone].    MyChart may be used to communicate with your provider, but this is not intended to be used for emergencies.    Crisis Resources:    National Suicide Prevention Lifeline: 119.800.8588 (TTY: 703.285.9411). Call anytime for help.  (www.suicidepreventionlifeline.org)  National [place] on Mental Illness (www.dante.org): 424.371.9999 or 827-586-0404.   Mental Health Association (www.mentalhealth.org): 650.724.5859 or 162-883-5771.  [place] Crisis [place]: Text [place] to 674028  Suicide LifeLine Chat: suicidepreve  Administrative Billing:   Time spent with patient was 30 minutes and greater than 50% of time or 20 minutes was spent in counseling and coordination of care regarding above diagnoses and treatment plan.    Patient Status:  Patient will continue to be seen for ongoing consultation and stabilization.    Signed:   BIJU Rebolledo-BC   Psychiatry

## 2022-01-06 ENCOUNTER — VIRTUAL VISIT (OUTPATIENT)
Dept: INTERNAL MEDICINE | Facility: CLINIC | Age: 27
End: 2022-01-06
Payer: COMMERCIAL

## 2022-01-06 DIAGNOSIS — F31.61 BIPOLAR DISORDER, CURRENT EPISODE MIXED, MILD (H): ICD-10-CM

## 2022-01-06 DIAGNOSIS — F90.0 ATTENTION DEFICIT HYPERACTIVITY DISORDER (ADHD), PREDOMINANTLY INATTENTIVE TYPE: ICD-10-CM

## 2022-01-06 DIAGNOSIS — F90.0 ATTENTION DEFICIT HYPERACTIVITY DISORDER (ADHD), PREDOMINANTLY INATTENTIVE TYPE: Primary | ICD-10-CM

## 2022-01-06 PROCEDURE — 99213 OFFICE O/P EST LOW 20 MIN: CPT | Mod: GT | Performed by: NURSE PRACTITIONER

## 2022-01-06 RX ORDER — METHYLPHENIDATE HYDROCHLORIDE 18 MG/1
TABLET ORAL
Qty: 60 TABLET | Refills: 0 | Status: SHIPPED | OUTPATIENT
Start: 2022-01-06 | End: 2022-01-07

## 2022-01-06 RX ORDER — ATOMOXETINE 25 MG/1
25 CAPSULE ORAL DAILY
Qty: 30 CAPSULE | Refills: 3 | Status: SHIPPED | OUTPATIENT
Start: 2022-01-06 | End: 2022-06-21

## 2022-01-06 NOTE — PROGRESS NOTES
Marcela is a 26 year old who is being evaluated via a billable video visit.      How would you like to obtain your AVS? Mail a copy  If the video visit is dropped, the invitation should be resent by: Text to cell phone: [phone]  Will anyone else be joining your video visit? No    Video Start Time: 5:40 PM    Assessment & Plan     Attention deficit hyperactivity disorder (ADHD), predominantly inattentive type  Will fill x 1 and then per psych as I will not do 2 ADD medication and do not care for bipolar medication   - atomoxetine (STRATTERA) 25 MG capsule; Take 1 capsule (25 mg) by mouth daily  - methylphenidate HCl ER (CONCERTA) 18 MG CR tablet; TAKE 2 TABLETS BY MOUTH IN THE MORNING - Fill 28 days after last refill    Bipolar disorder, current episode mixed, mild (H)  See psych ongoing for medication         20 minutes spent on the date of the encounter doing chart review, history and exam, documentation and further activities per the note       See Patient Instructions  Patient Instructions   Call and make appointment with psych for medication   I do not do bipolar medication and would not do 2 medication for ADD long term   Filled x 1  Need to see psych long term for medication       Return in about 1 year (around 1/6/2023).    DONN Campos CNP  [facility]   Marcela is a 26 year old who presents for the following health issues     HPI recheck meds needs refills    She was seen in our clinic by Shanique Talbot who referred to psych, and she saw til now   Needs refill on straterra and concerta, which I will do once and then should be getting from psych ongoing   I do not usually treat bipolar, and do not give 2 ADD medication at one time   Will need psych ongoing for refills   Phone number given  For psych   Patient agreeable to this     Review of Systems   Constitutional, HEENT, cardiovascular, pulmonary, GI, , musculoskeletal, neuro, skin, endocrine and psych systems  are negative, except as otherwise noted.      Objective           Vitals:  No vitals were obtained today due to virtual visit.    Physical Exam   GENERAL: Healthy, alert and no distress  EYES: Eyes grossly normal to inspection.  No discharge or erythema, or obvious scleral/conjunctival abnormalities.  RESP: No audible wheeze, cough, or visible cyanosis.  No visible retractions or increased work of breathing.    SKIN: Visible skin clear. No significant rash, abnormal pigmentation or lesions.  NEURO: Cranial nerves grossly intact.  Mentation and speech appropriate for age.  PSYCH: Mentation appears normal, affect normal/bright, judgement and insight intact, normal speech and appearance well-groomed.                Video-Visit Details    Type of service:  Video Visit    Video End Time:5:53 PM    Originating Location (pt. Location): Home    Distant Location (provider location):  [facility]     Platform used for Video Visit: PhotoSolar

## 2022-01-06 NOTE — NURSING NOTE
"Chief Complaint   Patient presents with     Recheck Medication     initial There were no vitals taken for this visit. Estimated body mass index is 24.32 kg/m  as calculated from the following:    Height as of 5/13/21: 1.626 m (5' 4\").    Weight as of 5/13/21: 64.3 kg (141 lb 11.2 oz)..  bp completed using cuff size NA (Not Taken)  JACKY FERGUSON LPN  "

## 2022-01-06 NOTE — PATIENT INSTRUCTIONS
Call and make appointment with psych for medication   I do not do bipolar medication and would not do 2 medication for ADD long term   Filled x 1  Need to see psych long term for medication

## 2022-01-07 RX ORDER — METHYLPHENIDATE HYDROCHLORIDE 18 MG/1
TABLET ORAL
Qty: 60 TABLET | Refills: 0 | Status: SHIPPED | OUTPATIENT
Start: 2022-01-07 | End: 2022-02-08

## 2022-01-07 NOTE — TELEPHONE ENCOUNTER
Pt called regarding prescription, concerta. It was sent to the [facility] pharmacy and they are out of stock and she needs the prescription sent to another pharmacy.    Pharmacy: [address] [place] [address] [address] [facility]   PHONE : [phone]

## 2022-01-27 NOTE — PATIENT INSTRUCTIONS
1. Lamotrigine 100 mg twice daily    2. Concerta 36 mg daily    3. Atomoxetine 25 mg daily        Continue all other medications as reviewed per electronic medical record today.     Safety plan reviewed. To the Emergency Department as needed or call after hours crisis line at [phone] or [phone]. [place] Crisis Text [place]. Text [place] to 336252 or Suicide LifeLine Chat: suicidepreM-SIXline.org/chat/    To schedule individual or family therapy, call [facility] at [phone].    Schedule an appointment with me in 6 weeks or sooner as needed. Call [facility] at [phone] to schedule.    Follow up with primary care provider as planned or for acute medical concerns.    Call the psychiatric nurse line with medication questions or concerns at [phone].    C[employer] Technology may be used to communicate with your provider, but this is not intended to be used for emergencies.    Crisis Resources:    National Suicide Prevention Lifeline: 720.214.1548 (TTY: 189.987.4206). Call anytime for help.  (www.suicidepreventionlifeline.org)  National [place] on Mental Illness (www.dante.org): 319.115.3468 or 066-753-2837.   Mental Health Association (www.mentalhealth.org): 191.731.3376 or 541-939-1567.  [place] Crisis Text [place]: Text [place] to 736563  Suicide LifeLine Chat: suicidetino

## 2022-02-07 ENCOUNTER — TELEPHONE (OUTPATIENT)
Dept: PSYCHIATRY | Facility: CLINIC | Age: 27
End: 2022-02-07
Payer: COMMERCIAL

## 2022-02-07 DIAGNOSIS — F90.0 ATTENTION DEFICIT HYPERACTIVITY DISORDER (ADHD), PREDOMINANTLY INATTENTIVE TYPE: ICD-10-CM

## 2022-02-07 NOTE — TELEPHONE ENCOUNTER
Reason for call:  Other   Patient called regarding (reason for call): call back  Additional comments: pt requesting a callback, advised the next available appt is too far out (03/11/22).    Phone number to reach patient:  Home number on file [phone] (home)    Best Time:  Asap    Can we leave a detailed message on this number?  YES    Travel screening: Not Applicable

## 2022-02-07 NOTE — TELEPHONE ENCOUNTER
Phone call to Marcela. She is scheduled for an appt with Gely Salas 3/11/22. She initially said that she needed a refill on Lamictal through to appt, but then later said she had enough and needs a refill on her Concerta.     Concerta is prescribed by PCP. Encouraged patient to contact that office re: Concerta refill. Provided phone # for [facility] and suggested MyChart refill request. Marcela also mentioned she wants a referral to Neurology. Encouraged Marcela to discuss that with her PCP.    Encouraged Marcela to contact this office if she finds she will need a refill of Lamictal before 3/11/22 appt with Gely Salas.    Ana Erazo RN on 2/7/2022 at 4:02 PM

## 2022-02-08 RX ORDER — METHYLPHENIDATE HYDROCHLORIDE 18 MG/1
TABLET ORAL
Qty: 60 TABLET | Refills: 0 | Status: SHIPPED | OUTPATIENT
Start: 2022-02-08 | End: 2022-03-02

## 2022-02-13 ENCOUNTER — HEALTH MAINTENANCE LETTER (OUTPATIENT)
Age: 27
End: 2022-02-13

## 2022-02-24 ENCOUNTER — TELEPHONE (OUTPATIENT)
Dept: PSYCHIATRY | Facility: CLINIC | Age: 27
End: 2022-02-24

## 2022-02-24 DIAGNOSIS — F90.0 ATTENTION DEFICIT HYPERACTIVITY DISORDER (ADHD), PREDOMINANTLY INATTENTIVE TYPE: ICD-10-CM

## 2022-02-24 NOTE — TELEPHONE ENCOUNTER
2/24/22: Pt stated that she will be going on vacation and is concerned about not being able to refill her medication prior to leaving on 3/7/22. Pt was directed to contact her pharmacy regarding the medication and stated intent to do so. Would like a call back regarding if provider will refill her medication prior to appt in March so she will have it on vacation. Would like a call back regarding   Methylphenidate.

## 2022-02-25 RX ORDER — METHYLPHENIDATE HYDROCHLORIDE 18 MG/1
TABLET ORAL
Qty: 60 TABLET | Refills: 0 | Status: CANCELLED | OUTPATIENT
Start: 2022-02-25

## 2022-03-02 RX ORDER — METHYLPHENIDATE HYDROCHLORIDE 18 MG/1
TABLET ORAL
Qty: 60 TABLET | Refills: 0 | Status: SHIPPED | OUTPATIENT
Start: 2022-03-02 | End: 2022-03-23 | Stop reason: ALTCHOICE

## 2022-03-11 ENCOUNTER — VIRTUAL VISIT (OUTPATIENT)
Dept: PSYCHIATRY | Facility: CLINIC | Age: 27
End: 2022-03-11
Payer: COMMERCIAL

## 2022-03-11 DIAGNOSIS — Z53.9 NO SHOW: Primary | ICD-10-CM

## 2022-03-11 NOTE — PROGRESS NOTES
Patient no-showed today's appointment; provider notified for review of record, patient agrees to reschedule missed appointment - [place] of [place].

## 2022-03-16 ENCOUNTER — TELEPHONE (OUTPATIENT)
Dept: PSYCHIATRY | Facility: CLINIC | Age: 27
End: 2022-03-16
Payer: COMMERCIAL

## 2022-03-16 DIAGNOSIS — F90.0 ATTENTION DEFICIT HYPERACTIVITY DISORDER (ADHD), PREDOMINANTLY INATTENTIVE TYPE: ICD-10-CM

## 2022-03-16 RX ORDER — METHYLPHENIDATE HYDROCHLORIDE 18 MG/1
TABLET ORAL
Qty: 60 TABLET | Refills: 0 | OUTPATIENT
Start: 2022-03-16

## 2022-03-16 NOTE — TELEPHONE ENCOUNTER
Reason for call:  Medication   If this is a refill request, has the caller requested the refill from the pharmacy already? No  Will the patient be using a [facility]? No  Name of the pharmacy and phone number for the current request: [facility] #[id number] [place], [place] - [phone]    Name of the medication requested: Concerta     Other request: No    Phone number to reach patient:  Home number on file [phone] (home)    Best Time:  ASAP    Can we leave a detailed message on this number?  NO    Travel screening: Not Applicable

## 2022-03-16 NOTE — TELEPHONE ENCOUNTER
Please review script for COncerta, patient is requesting refill. Given on 30 pills to last for 28 days. Please send refill if appropriate and make changes as needed.   Disp Refills Start End VALERIE   methylphenidate HCl ER (CONCERTA) 18 MG CR tablet 60 tablet 0 3/2/2022  No   Sig: TAKE 2 TABLETS BY MOUTH IN THE MORNING - Fill 28 days after last refill   Sent to pharmacy as: Methylphenidate HCl ER 18 MG Oral Tablet Extended Release (CONCERTA)   Class: E-Prescribe   Earliest Fill Date: 3/2/2022   Order: [id number]   E-Prescribing Status: Receipt confirmed by pharmacy (3/2/2022 10:33 AM CST)

## 2022-03-22 NOTE — TELEPHONE ENCOUNTER
PRIOR AUTHORIZATION DENIED    Medication: methylphenidate HCl ER (CONCERTA) 18 MG CR tablet- DENIED     Denial Date: 3/22/2022    Denial Rational: Patient needs to use the higher available strength.      Appeal Information: If provider would like to appeal please provide a letter of medical necessity.

## 2022-03-22 NOTE — TELEPHONE ENCOUNTER
Central Prior Authorization Team  Phone: [phone]    PA Initiation    Medication: methylphenidate HCl ER (CONCERTA) 18 MG CR tablet  Insurance Company: [employer] - Phone [phone] Fax [phone]  Pharmacy Filling the Rx: [facility] #[id number] [place], [place] - [address] [place] [address] [address] [facility] [address] [facility]  Filling Pharmacy Phone: [phone]  Filling Pharmacy Fax:    Start Date: 3/22/2022

## 2022-03-22 NOTE — TELEPHONE ENCOUNTER
Reason for call:  Medication     If this is a refill request, has the caller requested the refill from the pharmacy already? Yes     Will the patient be using a [facility]? No     Name of the pharmacy and phone number for the current request:   [facility] #[id number] - [place], [place] - [address] [place] [address] [address] AT [facility] [address] [facility] [address]  [phone]    Name of the medication requested: Concerta    Other request: Pt stated that they need a prior auth sent to their insurance to fill this medication. Pt has new [employer] insurance that auth needs to be sent to along with their old insurance. Pt has been out of this med for 2 weeks.     Phone number to reach patient:  Home number on file [phone] (home)    Best Time:  ASAP    Can we leave a detailed message on this number?  YES    Travel screening: Not Applicable

## 2022-03-23 RX ORDER — METHYLPHENIDATE HYDROCHLORIDE 36 MG/1
36 TABLET ORAL EVERY MORNING
Qty: 15 TABLET | Refills: 0 | Status: SHIPPED | OUTPATIENT
Start: 2022-03-23 | End: 2022-04-05

## 2022-03-23 NOTE — TELEPHONE ENCOUNTER
Gely Salas, ANGELICA  Psych Rn - [facility] 48 minutes ago (10:05 AM)     [place]    She no showed for her March 11 visit so jailene you please contact her to see how she is  doing and  what  dose of  Concerta she was previously on?  I saw that it was 36 mg.  Thanks    Message text          I am still taking concerta and I haven't had it in couple weeks possibly about 2 weeks. I am at work and unsure of the dosage. RN gave patient intake number to call back and leave message with dosage for provider to do the appeal letter. Patient agreed to call back and leave message as [facility].

## 2022-03-23 NOTE — TELEPHONE ENCOUNTER
I ordered her 36  Mg tablet daily - 15 tabs to see if this will work for her instead of two 18 mg tabs daily.  Should not need PA for this.  (I hope!)

## 2022-03-23 NOTE — TELEPHONE ENCOUNTER
Gely Salas NP routed conversation to Psych Rn - Neeraj 2 minutes ago (11:03 AM)     Gely Salas NP 2 minutes ago (11:03 AM)     [place]       I ordered her 36  Mg tablet daily - 15 tabs to see if this will work for her instead of two 18 mg tabs daily.  Should not need PA for this.  (I hope!)        Called patient and informed her of the above. She will call back if needed.

## 2022-04-04 ENCOUNTER — TELEPHONE (OUTPATIENT)
Dept: BEHAVIORAL HEALTH | Facility: CLINIC | Age: 27
End: 2022-04-04
Payer: COMMERCIAL

## 2022-04-04 DIAGNOSIS — F90.0 ATTENTION DEFICIT HYPERACTIVITY DISORDER (ADHD), PREDOMINANTLY INATTENTIVE TYPE: ICD-10-CM

## 2022-04-04 NOTE — TELEPHONE ENCOUNTER
Reason for call:  Medication   If this is a refill request, has the caller requested the refill from the pharmacy already? Yes  Will the patient be using a [facility]? No  Name of the pharmacy and phone number for the current request: [facility]    Name of the medication requested: Concerta    Other request: Patient says she will not have enough to make it to her next appointment.    Phone number to reach patient:  Home number on file [phone] (home)    Best Time:  ASAP    Can we leave a detailed message on this number?  YES    Travel screening: Not Applicable

## 2022-04-05 RX ORDER — METHYLPHENIDATE HYDROCHLORIDE 36 MG/1
36 TABLET ORAL EVERY MORNING
Qty: 15 TABLET | Refills: 0 | Status: SHIPPED | OUTPATIENT
Start: 2022-04-12 | End: 2022-04-14

## 2022-04-05 NOTE — TELEPHONE ENCOUNTER
Date of Last Office Visit: 12/2/2021  Date of Next Office Visit: 5/5/2022  No shows since last visit: 1  Cancellations since last visit: none    Medication requested: Concerta 36 mg CR tablet Date last ordered: 3/23/2022 Qty: 15 Refills: 0     Review of [place] ?: yes  Medication last filled date: 3/23/2022 Qty filled: 15  Other controlled substance on [place] ?: yes  If yes, is this a new medication?: no  If yes, name of medication: no and date filled: no    Lapse in medication adherence greater than 5 days?: no  If yes, call patient and gather details: no  Medication refill request verified as identical to current order?: yes  Result of Last DAM, VPA, Li+ Level, CBC, or Carbamazepine Level (at or since last visit): N/A    Last visit treatment plan: Treatment Plan:  [email or web address]          1. Lamotrigine 100 mg twice daily    2. Concerta 36 mg daily    3. Atomoxetine 25 mg daily         Continue all other medications as reviewed per electronic medical record today.     Safety plan reviewed. To the Emergency Department as needed or call after hours crisis line at [phone] or [phone]. [place] Crisis Text Line. Text [place] to 126340 or Suicide LifeLine Chat: suicidepreventionlifeline.org/chat/    To schedule individual or family therapy, call [facility] at [phone].    Schedule an appointment with me in 6 weeks or sooner as needed. Call [facility] at [phone] to schedule.    Follow up with primary care provider as planned or for acute medical concerns.    Call the psychiatric nurse line with medication questions or concerns at [phone].    MyChart may be used to communicate with your provider, but this is not intended to be used for emergencies      []Medication refilled per  Medication Refill in Ambulatory Care  policy.  [x]Medication unable to be refilled by RN due to criteria not met as indicated below:    []Eligibility - not seen in the last  year   []Supervision - no future appointment   []Compliance - no shows, cancellations or lapse in therapy   []Verification - order discrepancy   [x]Controlled medication   [x]Medication not included in policy   []90-day supply request   []Other

## 2022-04-06 NOTE — TELEPHONE ENCOUNTER
4/6/22: Pt called stating that she has two days left of her Concerta rx, contacted pharmacy but they cannot fill it w/out auth. Current pharmacy is [phone]; [address]. Pt can be reached at [phone], okay to [facility].

## 2022-04-07 NOTE — TELEPHONE ENCOUNTER
Dylon Hong -   This patient is requesting a refill of her Concerta 36 mg. It appears she had a NO SHOW with you 3/11/22. Also appears you have Concerta to be filled on 4/12/22. Her next appt is scheduled for 5/5/22.     Are you wanting her to wait for the 4/12/22 refill that you placed on 4/5/22?        Rebeca Wallace RN on 4/7/2022 at 7:24 AM

## 2022-04-13 ENCOUNTER — TELEPHONE (OUTPATIENT)
Dept: PSYCHIATRY | Facility: CLINIC | Age: 27
End: 2022-04-13
Payer: COMMERCIAL

## 2022-04-13 DIAGNOSIS — F90.0 ATTENTION DEFICIT HYPERACTIVITY DISORDER (ADHD), PREDOMINANTLY INATTENTIVE TYPE: ICD-10-CM

## 2022-04-13 NOTE — TELEPHONE ENCOUNTER
Reason for call:  Other   Patient called regarding (reason for call): prescription  Additional comments: Pt called about a prescription that was supposed to be filled for a month, but was actually filled for 15 days. Pt stated that she is going out of the country soon, and currently does not have enough of her medication to last her, until her next appointment. Pt would like to speak to someone from Gely's team in regards to this.    Phone number to reach patient:  Cell number on file:    Telephone Information:   Mobile [phone]       Best Time:  ASAP    Can we leave a detailed message on this number?  YES    Travel screening: Not Applicable

## 2022-04-13 NOTE — TELEPHONE ENCOUNTER
Called patient no answer, left voice message. Routing to provider for review. Patient wants full month of medication.

## 2022-04-14 RX ORDER — METHYLPHENIDATE HYDROCHLORIDE 36 MG/1
36 TABLET ORAL EVERY MORNING
Qty: 30 TABLET | Refills: 0 | Status: SHIPPED | OUTPATIENT
Start: 2022-04-14 | End: 2022-06-02

## 2022-04-19 NOTE — TELEPHONE ENCOUNTER
Pt called intake about this prescription. Pt reported that her pharmacy won't fill the prescription until the 27th and pt is leaving for vacation on the 26th. Pt is wondering if she can get that filled a day earlier. Pt can be reached back at [phone]. Ok to leave a detailed message if need be.

## 2022-04-22 NOTE — TELEPHONE ENCOUNTER
Gely Salas NP  Psych Rn - [facility] 16 minutes ago (1:38 PM)     [place]    How long is she going to be gone?  Then I can order enough medication until she gets back.  Thanks.  Bernice    Message text         Called patient, no answer, left voice message.

## 2022-06-21 ENCOUNTER — VIRTUAL VISIT (OUTPATIENT)
Dept: PSYCHIATRY | Facility: CLINIC | Age: 27
End: 2022-06-21
Payer: COMMERCIAL

## 2022-06-21 ENCOUNTER — VIRTUAL VISIT (OUTPATIENT)
Dept: BEHAVIORAL HEALTH | Facility: CLINIC | Age: 27
End: 2022-06-21
Payer: COMMERCIAL

## 2022-06-21 DIAGNOSIS — F31.61 BIPOLAR DISORDER, CURRENT EPISODE MIXED, MILD (H): Primary | ICD-10-CM

## 2022-06-21 DIAGNOSIS — F90.0 ATTENTION DEFICIT HYPERACTIVITY DISORDER (ADHD), PREDOMINANTLY INATTENTIVE TYPE: ICD-10-CM

## 2022-06-21 DIAGNOSIS — E55.9 VITAMIN D DEFICIENCY: ICD-10-CM

## 2022-06-21 DIAGNOSIS — F41.1 GENERALIZED ANXIETY DISORDER: ICD-10-CM

## 2022-06-21 PROCEDURE — 99214 OFFICE O/P EST MOD 30 MIN: CPT | Mod: 95 | Performed by: NURSE PRACTITIONER

## 2022-06-21 PROCEDURE — 90832 PSYTX W PT 30 MINUTES: CPT | Mod: 95 | Performed by: COUNSELOR

## 2022-06-21 RX ORDER — METHYLPHENIDATE HYDROCHLORIDE 36 MG/1
36 TABLET ORAL EVERY MORNING
Qty: 20 TABLET | Refills: 0 | Status: SHIPPED | OUTPATIENT
Start: 2022-06-21 | End: 2022-07-18

## 2022-06-21 NOTE — PROGRESS NOTES
"[facility] - Collaborative Care Psychiatry Service     2022      Behavioral Health Clinician Progress Note    Patient Name: Marcela Harris           Service Type:  Individual      Service Location:   MyChart / Email (patient reached)     Session Start Time: 1220pm  Session End Time: 1240pm      Session Length: 16 - 37      Attendees: Patient     Service Modality:  Phone Visit:      Provider verified identity through the following two step process.  Patient provided:  Patient  and Patient address    The patient has been notified of the following:      \"We have found that certain health care needs can be provided without the need for a face to face visit.  This service lets us provide the care you need with a phone conversation.       I will have full access to your [facility] medical record during this entire phone call.   I will be taking notes for your medical record.      Since this is like an office visit, we will bill your insurance company for this service.       There are potential benefits and risks of telephone visits (e.g. limits to patient confidentiality) that differ from in-person visits.?Confidentiality still applies for telephone services, and nobody will record the visit.  It is important to be in a quiet, private space that is free of distractions (including cell phone or other devices) during the visit.??      If during the course of the call I believe a telephone visit is not appropriate, you will not be charged for this service\"     Consent has been obtained for this service by care team member: Yes     Visit Activities (Refresh list every visit): [facility] Only    Diagnostic Assessment Date: 2021 Gely Salas   Treatment Plan Review Date: N/A, will be developed next meeting   See Flowsheets for today's PHQ-9 and JAISON-7 results  Previous PHQ-9:   PHQ-9 SCORE 2021   PHQ-9 Total Score 10 3     Previous JAISON-7:   JAISON-7 SCORE 2021   Total Score " 11 1       ADENIKE LEVEL:  No flowsheet data found.    DATA  Extended Session (60+ minutes): No  Interactive Complexity: No  Crisis: No  [facility] Patient: No    Treatment Objective(s) Addressed in This Session:  Target Behavior(s): heart rate, craving, worry, feeling down    Depressed Mood: Decrease frequency and intensity of feeling down, depressed, hopeless  Improve diet, appetite, mindful eating, and / or meal planning  Improve concentration, focus, and mindfulness in daily activities   Anxiety: will experience a reduction in anxiety, will develop more effective coping skills to manage anxiety symptoms, will develop healthy cognitive patterns and beliefs and will increase ability to function adaptively    Current Stressors / Issues:   update: Pt reports that they have been able to concentrate better. Pt states that they and their doctor are concerned about a rapid heart rate. Pt says that they will get really thirsty. Pt is wondering if it is the methylphenidate is the one causing the rapid heart rate. Pt states that their father has anxiety and depression disorder. Pt is trying to keep their schedule consistent. Pt had COVID and the breathing problem gave them a little scare. When medication wears off pt will get a craving and are trying to figure out if it is food.    Stressors: none  Side Effects: rapid heart rate, thirst  Mood: no mood swings   Appetite: unremarkable  Sleep: unremarkable  Pregnancy: No   Impulsive spending/spending sprees: shop on [employer] a lot   Suicidality: Pt denies   Self-harm: Pt denies  Substance Use: alcohol- none   Caffeine: soda rarely   Therapist: no referral, pt is wanting a therapist- referral made and info for [facility]   Interventions: [facility] made referral for therapy and sent therapy resources through My Chart  Medication Questions/Requests: side effects possibly, if can refill emergency refill          Progress on Treatment Objective(s) / Homework:  Satisfactory progress -  ACTION (Actively working towards change); Intervened by reinforcing change plan / affirming steps taken    Motivational Interviewing    MI Intervention: Co-Developed Goal: reduce depression and anxiety, Expressed Empathy/Understanding, Supported Autonomy, Collaboration, Evocation, Permission to raise concern or advise, Open-ended questions, Reflections: simple and complex, Change talk (evoked) and Reframe     Change Talk Expressed by the Patient: Need to change Committment to change Taking steps    Provider Response to Change Talk: E - Evoked more info from patient about behavior change, A - Affirmed patient's thoughts, decisions, or attempts at behavior change, R - Reflected patient's change talk and S - Summarized patient's change talk statements    Also provided psychoeducation about behavioral health condition, symptoms, and treatment options    Care Plan review completed: No    Medication Review:  No changes to current psychiatric medication(s)    Medication Compliance:  Yes    Changes in Health Issues:   Yes: pt had COVID    Chemical Use Review:   Substance Use: Chemical use reviewed, no active concerns identified      Tobacco Use: No current tobacco use.      Assessment: Current Emotional / Mental Status (status of significant symptoms):  Risk status (Self / Other harm or suicidal ideation)  Patient denies a history of suicidal ideation, suicide attempts, self-injurious behavior, homicidal ideation, homicidal behavior and and other safety concerns  Patient denies current fears or concerns for personal safety.  Patient denies current or recent suicidal ideation or behaviors.  Patient denies current or recent homicidal ideation or behaviors.  Patient denies current or recent self injurious behavior or ideation.  Patient denies other safety concerns.  A safety and risk management plan has not been developed at this time, however patient was encouraged to call [facility] / [facility] should there be a change in any  of these risk factors.    Appearance:   Unable to assess-telephone visit  Eye Contact:   Unable to assess-telephone visit  Psychomotor Behavior: Normal   Attitude:   Cooperative   Orientation:   All  Speech   Rate / Production: Normal    Volume:  Normal   Mood:    Normal  Affect:    Appropriate   Thought Content:  Clear   Thought Form:  Coherent  Logical   Insight:    Good     Diagnoses:  1. Bipolar disorder, current episode mixed, mild (H)    2. Generalized anxiety disorder    3. Attention deficit hyperactivity disorder (ADHD), predominantly inattentive type        Collateral Reports Completed:  Communicated with:   Communicated with JOHANNA RebolledoBC   [facility]    Plan: (Homework, other):  Patient was given information about behavioral services and encouraged to schedule a follow up appointment with the clinic [facility] in conjunction with CCPs appointment.  She was also given information about mental health symptoms and treatment options  and therapy resources.  CD Recommendations: No indications of CD issues.     ISAMAR Martines, [facility]  June 21, 2022

## 2022-06-21 NOTE — PROGRESS NOTES
"Marcela is a 27 year old who is being evaluated via a billable telephonevisit.      How would you like to obtain your AVS? MyChart  If the video visit is dropped, the invitation should be resent by: Text to cell phone: [phone]  Will anyone else be joining your video visit? No    Priscila Goldman VF    Video-Visit Details    Video Start Time: 1:08 PM    Type of service:  Telephone Visit    Video End Time:1:37 PM    Originating Location (pt. Location): Other car    Distant Location (provider location):  [facility] & [facility]     Platform used for Video Visit: [facility]              Outpatient Psychiatric Progress Note    Name: Marcela Harris   : 1995                    Primary Care Provider: Sol Talbot CNP   Therapist: None    PHQ-9 scores:  PHQ-9 SCORE 2021   PHQ-9 Total Score 10 3       JAISON-7 scores:  JAISON-7 SCORE 2021   Total Score 11 1       Patient Identification:    Patient is a 27 year old year old, single  White Not  or  female  who presents for return visit with me.  Patient is currently employed full time. Patient attended the session alone. Patient prefers to be called: \"Marcela\".    Current medications include: atomoxetine (STRATTERA) 25 MG capsule, Take 1 capsule (25 mg) by mouth daily  etonogestrel (NEXPLANON) 68 MG IMPL, 1 each by Subdermal route once  lamoTRIgine (LAMICTAL) 100 MG tablet, Take 1 tablet (100 mg) by mouth 2 times daily  methylphenidate (CONCERTA) 36 MG CR tablet, Take 1 tablet (36 mg) by mouth every morning Must attend follow up appointment for future refills    No current facility-administered medications on file prior to visit.       The [place] Prescription Monitoring Program has been reviewed and there are no concerns about diversionary activity for controlled substances at this time.      I was able to review most recent Primary Care Provider, specialty provider, and therapy visit notes that I have " "access to.     Today, patient reports that she has not taken her strattera for a week.She talked about  having mood swings.  This comes in \"waves throughout the day.  Discussion ensued about signs and symptoms of bipolar disorder.  She denies hallucinations and no sleep concerns.  She reports no sleep concerns and no thoughts to want to end her life.     has a past medical history of Attention deficit hyperactivity disorder (ADHD), predominantly inattentive type, Contraceptive management, Gastroesophageal reflux disease without esophagitis, and Seizure disorder (H) (2000).    Social history updates:    Patient reports that she is working at a [employer] and likes working there.     Substance use updates:    No alcohol  Tobacco use: No    Vital Signs:   There were no vitals taken for this visit.    Labs:    Most recent laboratory results reviewed and no new labs.     Mental Status Examination:  Appearance: awake, alert  Attitude: cooperative  Eye Contact:  On telephone  Gait and Station: No assistive Devices used and No dizziness or falls  Psychomotor Behavior:  Unable to assess  Oriented to:  time, person, and place  Attention Span and Concentration:  Normal  Speech:   clear, coherent and Speaks: English  Mood:  anxious and depressed  Affect:  mood congruent  Associations:  no loose associations  Thought Process:  goal oriented  Thought Content:  no evidence of suicidal ideation or homicidal ideation, no auditory hallucinations present and no visual hallucinations present  Recent and Remote Memory:  intact Not formally assessed. No amnesia.  Fund of Knowledge: appropriate  Insight:  good  Judgment:  intact  Impulse Control:  intact    Suicide Risk Assessment:  Today Marcela Harris reports no thoughts to harm themself or others. In addition, there are notable risk factors for self-harm, including single status and anxiety. However, risk is mitigated by commitment to family, history of seeking help when needed, future " oriented, denies suicidal intent or plan and denies homicidal ideation, intent, or plan. Therefore, based on all available evidence including the factors cited above, Marcela Harris does not appear to be at imminent risk for self-harm, does not meet criteria for a 72-hr hold, and therefore remains appropriate for ongoing outpatient level of care.  A thorough assessment of risk factors related to suicide and self-harm have been reviewed and are noted above. The patient convincingly denies suicidality on several occasions. Local community safety resources printed and reviewed for patient to use if needed. There was no deceit detected, and the patient presented in a manner that was believable.     DSM5 Diagnosis:  Attention-Deficit/Hyperactivity Disorder  314.01 (F90.2) Combined presentation  296.89 Bipolar II Disorder With mixed features and moderate    Medical comorbidities include:   Patient Active Problem List    Diagnosis Date Noted     Attention deficit hyperactivity disorder (ADHD), predominantly inattentive type      Priority: Medium     Gastroesophageal reflux disease without esophagitis      Priority: Medium     Seizure disorder (H) 2000     Priority: Medium     TBI with brain tumor         Assessment:    Marcela Harris is having some slight mood dysregulation.  She had stopped the Strattera.  She will continue methylphenidate 36 mg daily instead for her ADHD symptoms.  Lab work is ordered to rule out any physical reasons for her anxiety and mood shifts.  Lamictal is ordered by another provider for seizure control.  She is urged to make a yearly wellness check appointment with her primary care provider.  Talk therapy is recommended for her in the future in order to process life stressors..    Medication side effects and alternatives were reviewed. Health promotion activities recommended and reviewed today. All questions addressed. Education and counseling completed regarding risks and benefits of medications and  psychotherapy options.    Treatment Plan:        1.  TSH, T4, vitamin D level, B12, folate, C BC with platelets and differential    2.  Continue methylphenidate 36 mg daily    3.  Discontinue Strattera    4.  Continue Lamictal for seizure control    5.  Make appointment to obtain a primary care provider for wellness check    6.  Talk therapy when able to in order to process life stressors        Continue all other medications as reviewed per electronic medical record today.     Safety plan reviewed. To the Emergency Department as needed or call after hours crisis line at [phone] or [phone]. [place] Crisis Text [place]. Text [place] to 883899 or Suicide LifeLine Chat: [email or web address].org/chat/    To schedule individual or family therapy, call [facility] at [phone]    Schedule an appointment with me in 6 weeks or sooner as needed. Call [facility] at [phone] to schedule.    Follow up with primary care provider as planned or for acute medical concerns.    Call the psychiatric nurse line with medication questions or concerns at [phone]    SolarPrinthart may be used to communicate with your provider, but this is not intended to be used for emergencies.    Crisis Resources:    National Suicide Prevention Lifeline: 840.591.8643 (TTY: 342.810.7898). Call anytime for help.  (www.suicidepreventionlifeline.org)  National [place] on Mental Illness (www.dante.org): 547.863.2718 or 228-951-9762.   Mental Health Association (www.mentalhealth.org): 108.623.2191 or 094-014-9139.  [place] Crisis [place]: Text [place] to 487240  Suicide LifeLine Chat: suicideWorldPassKey.org/chat    Administrative Billing:   Time spent with patient includes counseling and coordination of care regarding above diagnoses and treatment plan.    Patient Status:  Patient will continue to be seen for ongoing consultation and stabilization.    Signed:   BIJU Rebolledo-BC   Psychiatry

## 2022-06-21 NOTE — PATIENT INSTRUCTIONS
1.  TSH, T4, vitamin D level, B12, folate, C BC with platelets and differential  2.  Continue methylphenidate 36 mg daily  3.  Discontinue Strattera  4.  Continue Lamictal for seizure control  5.  Make appointment to obtain a primary care provider for wellness check  6.  Talk therapy when able to in order to process life stressors    Continue all other medications as reviewed per electronic medical record today.   Safety plan reviewed. To the Emergency Department as needed or call after hours crisis line at [phone] or [phone]. [place] Crisis Text [place]. Text [place] to 246831 or Suicide LifeLine Chat: pr2go.com.org/chat/  To schedule individual or family therapy, call [facility] at [phone]  Schedule an appointment with me in 6 weeks or sooner as needed. Call [facility] at [phone] to schedule.  Follow up with primary care provider as planned or for acute medical concerns.  Call the psychiatric nurse line with medication questions or concerns at [phone]  MyChart may be used to communicate with your provider, but this is not intended to be used for emergencies.    Crisis Resources:    National Suicide Prevention Lifeline: 833.149.2349 (TTY: 920.283.8132). Call anytime for help.  (www.suicidepreventionlifeline.org)  National [place] on Mental Illness (www.dante.org): 694.855.4644 or 221-036-9084.   Mental Health Association (www.mentalhealth.org): 844.571.2305 or 076-482-8619.  [place] Crisis [place]: Text [place] to 280505  Suicide LifeLine Chat: suicideOpenDoors.su.org/chat

## 2022-08-10 ENCOUNTER — VIRTUAL VISIT (OUTPATIENT)
Dept: PSYCHIATRY | Facility: CLINIC | Age: 27
End: 2022-08-10
Payer: COMMERCIAL

## 2022-08-10 ENCOUNTER — VIRTUAL VISIT (OUTPATIENT)
Dept: BEHAVIORAL HEALTH | Facility: CLINIC | Age: 27
End: 2022-08-10
Payer: COMMERCIAL

## 2022-08-10 DIAGNOSIS — F31.61 BIPOLAR DISORDER, CURRENT EPISODE MIXED, MILD (H): ICD-10-CM

## 2022-08-10 DIAGNOSIS — F41.1 GENERALIZED ANXIETY DISORDER: Primary | ICD-10-CM

## 2022-08-10 DIAGNOSIS — F90.0 ATTENTION DEFICIT HYPERACTIVITY DISORDER (ADHD), PREDOMINANTLY INATTENTIVE TYPE: ICD-10-CM

## 2022-08-10 PROCEDURE — 99214 OFFICE O/P EST MOD 30 MIN: CPT | Mod: 95 | Performed by: NURSE PRACTITIONER

## 2022-08-10 PROCEDURE — 90832 PSYTX W PT 30 MINUTES: CPT | Mod: 95 | Performed by: COUNSELOR

## 2022-08-10 RX ORDER — METHYLPHENIDATE HYDROCHLORIDE 36 MG/1
36 TABLET ORAL EVERY MORNING
Qty: 30 TABLET | Refills: 0 | Status: SHIPPED | OUTPATIENT
Start: 2022-08-10 | End: 2022-10-24

## 2022-08-10 RX ORDER — METHYLPHENIDATE HYDROCHLORIDE 36 MG/1
36 TABLET ORAL EVERY MORNING
Qty: 30 TABLET | Refills: 0 | Status: SHIPPED | OUTPATIENT
Start: 2022-09-12 | End: 2022-10-24

## 2022-08-10 RX ORDER — LAMOTRIGINE 100 MG/1
100 TABLET ORAL 2 TIMES DAILY
Qty: 30 TABLET | Refills: 3 | Status: SHIPPED | OUTPATIENT
Start: 2022-08-10 | End: 2023-04-07

## 2022-08-10 RX ORDER — METHYLPHENIDATE HYDROCHLORIDE 36 MG/1
36 TABLET ORAL EVERY MORNING
Qty: 30 TABLET | Refills: 0 | Status: SHIPPED | OUTPATIENT
Start: 2022-08-15 | End: 2022-10-24
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
## 2022-08-10 NOTE — PROGRESS NOTES
[facility] Care Psychiatry Service     August 10, 2022      Behavioral Health Clinician Progress Note    Patient Name: Marcela Harris           Service Type:  Individual      Service Location:   MyChart / Email (patient reached)     Session Start Time: 822am  Session End Time: 857am      Session Length: 16 - 37      Attendees: Patient     Service Modality:  Video Visit:      Provider verified identity through the following two step process.  Patient provided:  Patient is known previously to provider and Patient was verified at admission/transfer     Telemedicine Visit: The patient's condition can be safely assessed and treated via synchronous audio and visual telemedicine encounter.       Consent:  The patient/guardian has verbally consented to: the potential risks and benefits of telemedicine (video visit) versus in person care; bill my insurance or make self-payment for services provided; and responsibility for payment of non-covered services.      Mode of Communication:  Video Conference via [employer]     As the provider I attest to compliance with applicable laws and regulations related to telemedicine.        Visit Activities (Refresh list every visit): [facility] Only    Diagnostic Assessment Date: 07/23/2021 Gely Salas   Treatment Plan Review Date: N/A, will be developed next meeting due to time constraints and needs of pt   See Flowsheets for today's PHQ-9 and JAISON-7 results  Previous PHQ-9:   PHQ-9 SCORE 7/23/2021 9/20/2021   PHQ-9 Total Score 10 3     Previous JAISON-7:   JAISON-7 SCORE 7/23/2021 9/20/2021   Total Score 11 1       ADENIKE LEVEL:  No flowsheet data found.    DATA  Extended Session (60+ minutes): No  Interactive Complexity: No  Crisis: No  [facility] Patient: No    Treatment Objective(s) Addressed in This Session:  Target Behavior(s): craving, worry, feeling down, thoughts cunsuming pt    Depressed Mood: Decrease frequency and intensity of feeling down, depressed, hopeless  Improve  "diet, appetite, mindful eating, and / or meal planning  Improve concentration, focus, and mindfulness in daily activities   Anxiety: will experience a reduction in anxiety, will develop more effective coping skills to manage anxiety symptoms, will develop healthy cognitive patterns and beliefs and will increase ability to function adaptively    Current Stressors / Issues:   update: Pt says that they have been having cravings and pt was taken off of medications that they were on for a very long time. Pt reports that they are craving junk food and ice cream since this will sastify the need. Pt states that they have been carpio. Pt is still feeling thirsty and is filling a large water bottle 3 times a day. Pt is concerned about gaining weight due to junk food. Pt says that their step-sister moved out.     Mood: \"it has it's ups and downs, having bad thoughts\" Despair related thing of harsh situations in head.   Appetite: unremarkable, craving junk food, eating more possibly at a healthy pace, been eating 3 meals   Sleep: unremarkable      Suicidality: Pt hs thoughts of not living and they are Advent, pt is reading the Bible and this has decreased thoughts, down to once a day for 15-20 minutes- uses salvatore on phone, pt not interested crisis resources at this time since it has decreased when [facility] asks pt if they would like [facility] to send them   Self-harm: Pt denies  Substance Use: Pt denies  Caffeine: soda rarely   Therapist: referral made, [facility] encourages pt to contact behavioral access to get an appointment scheduled   Interventions: [facility] demonstrated difusion skills and practiced them with pt in meeting to encourage to to separate themselves from their thoughts and emotions and to recognize that thoughts are just words put together and we assign the meeting by saying the thought 3 times, imagining thoughts floating on a leaf down a stream or on a cloud and teaching mindfulness to stay present and thank the brain for the " thought or worry and to direct their focus to the present and an object   Medication Questions/Requests: is there a way to stop the side effects of this         Progress on Treatment Objective(s) / Homework:  Satisfactory progress - ACTION (Actively working towards change); Intervened by reinforcing change plan / affirming steps taken     CBT- Pt reports that since they have started reading the Bible their SI thoughts have decreased. Pt is open to learning new ways to manage anxiety and worry.     Motivational Interviewing    MI Intervention: Co-Developed Goal: reduce depression and anxiety, Expressed Empathy/Understanding, Supported Autonomy, Collaboration, Evocation, Permission to raise concern or advise, Open-ended questions, Reflections: simple and complex, Change talk (evoked) and Reframe     Change Talk Expressed by the Patient: Need to change Committment to change Taking steps    Provider Response to Change Talk: E - Evoked more info from patient about behavior change, A - Affirmed patient's thoughts, decisions, or attempts at behavior change, R - Reflected patient's change talk and S - Summarized patient's change talk statements    Also provided psychoeducation about behavioral health condition, symptoms, and treatment options    Care Plan review completed: No    Medication Review:  No changes to current psychiatric medication(s)    Medication Compliance:  Yes    Changes in Health Issues:   None reported    Chemical Use Review:   Substance Use: Chemical use reviewed, no active concerns identified      Tobacco Use: No current tobacco use.      Assessment: Current Emotional / Mental Status (status of significant symptoms):  Risk status (Self / Other harm or suicidal ideation)  Patient denies a history of suicidal ideation, suicide attempts, self-injurious behavior, homicidal ideation, homicidal behavior and and other safety concerns  Patient denies current fears or concerns for personal safety.  Patient  reports the following current or recent suicidal ideation or behaviors: thoughts for a brief time during the day and will read their Bile to help decrease these thoughts and cope.  Patient denies current or recent homicidal ideation or behaviors.  Patient denies current or recent self injurious behavior or ideation.  Patient denies other safety concerns.  A safety and risk management plan has not been developed at this time, however patient was encouraged to call [facility] should there be a change in any of these risk factors. Pt declined crisis resources since their SI have decreased. Pt reports reading the Bible has been helpful to reduce SI and help them cope.     Appearance:   Normal, hair put together   Eye Contact:   Good  Psychomotor Behavior: Normal   Attitude:   Cooperative  Interested  Orientation:   All  Speech   Rate / Production: Normal    Volume:  Normal   Mood:    Anxious  Sad   Affect:    Worrisome   Thought Content:  Clear   Thought Form:  Coherent  Logical   Insight:    Fair     Diagnoses:  1. Generalized anxiety disorder    2. Bipolar disorder, current episode mixed, mild (H)    3. Attention deficit hyperactivity disorder (ADHD), predominantly inattentive type        Collateral Reports Completed:  Communicated with:   Communicated with KARLEE Rebolledo [facility]    Plan: (Homework, other):  Patient was given information about behavioral services and encouraged to schedule a follow up appointment with the clinic [facility] in conjunction with CCPs appointment.  She was also given information about mental health symptoms and treatment options  and therapy resources.  [facility] encourages pt to practice mindfulness skills using all 5 senses to recognize thoughts trying to pull pt one direction and using this skill to get back to the present and not be controlled by thoughts. CD Recommendations: No indications of CD issues.     ISAMAR Martines, LICSW [facility]  August 10, 2022

## 2022-08-10 NOTE — PATIENT INSTRUCTIONS
1.  Continue Concerta 36 mg daily  2.  Continue lamotrigine 100 mg twice daily  3.  Referral resent for talk therapy to develop skills to manage ADHD and life stressors  4.  Obtain lab work at your earliest convenience    Continue all other medications as reviewed per electronic medical record today.   Safety plan reviewed. To the Emergency Department as needed or call after hours crisis line at [phone] or [phone]. [place] Crisis Text [place]. Text [place] to 762807 or Suicide LifeLine Chat: [email or web address].[email or web address]/chat/  To schedule individual or family therapy, call [facility] at [phone]  Schedule an appointment with me in 2 months or sooner as needed. Call [facility] at [phone] to schedule.  Follow up with primary care provider as planned or for acute medical concerns.  Call the psychiatric nurse line with medication questions or concerns at [phone]  MyChart may be used to communicate with your provider, but this is not intended to be used for emergencies.    Crisis Resources:    National Suicide Prevention Lifeline: 967.323.5572 (TTY: 212.597.2406). Call anytime for help.  (www.suicidepreventionlifeline.org)  National [place] on Mental Illness (www.dante.org): 647.108.2771 or 154-600-0699.   Mental Health Association (www.mentalhealth.org): 434.412.8135 or 158-489-2133.  [place] Crisis [place]: Text [place] to 331941  Suicide LifeLine Chat: suicideWorld Wide Premium Packers.org/chat

## 2022-10-15 ENCOUNTER — HEALTH MAINTENANCE LETTER (OUTPATIENT)
Age: 27
End: 2022-10-15

## 2022-10-24 ENCOUNTER — VIRTUAL VISIT (OUTPATIENT)
Dept: PSYCHIATRY | Facility: CLINIC | Age: 27
End: 2022-10-24
Payer: COMMERCIAL

## 2022-10-24 DIAGNOSIS — F41.1 GAD (GENERALIZED ANXIETY DISORDER): ICD-10-CM

## 2022-10-24 DIAGNOSIS — F31.61 BIPOLAR DISORDER, CURRENT EPISODE MIXED, MILD (H): Primary | ICD-10-CM

## 2022-10-24 DIAGNOSIS — F90.0 ATTENTION DEFICIT HYPERACTIVITY DISORDER (ADHD), PREDOMINANTLY INATTENTIVE TYPE: ICD-10-CM

## 2022-10-24 PROCEDURE — 99214 OFFICE O/P EST MOD 30 MIN: CPT | Mod: 95 | Performed by: NURSE PRACTITIONER

## 2022-10-24 RX ORDER — METHYLPHENIDATE HYDROCHLORIDE 36 MG/1
36 TABLET ORAL EVERY MORNING
Qty: 30 TABLET | Refills: 0 | Status: SHIPPED | OUTPATIENT
Start: 2022-12-19 | End: 2023-01-06

## 2022-10-24 RX ORDER — METHYLPHENIDATE HYDROCHLORIDE 36 MG/1
36 TABLET ORAL EVERY MORNING
Qty: 30 TABLET | Refills: 0 | Status: SHIPPED | OUTPATIENT
Start: 2022-11-21 | End: 2023-01-06

## 2022-10-24 RX ORDER — METHYLPHENIDATE HYDROCHLORIDE 36 MG/1
36 TABLET ORAL EVERY MORNING
Qty: 30 TABLET | Refills: 0 | Status: SHIPPED | OUTPATIENT
Start: 2022-10-24 | End: 2023-01-06

## 2022-10-24 NOTE — PROGRESS NOTES
"Marcela is a 27 year old who is being evaluated via a billable video visit.      How would you like to obtain your AVS? MyChart  If the video visit is dropped, the invitation should be resent by: Text to cell phone: [phone]  Will anyone else be joining your video visit? No        Video-Visit Details    Video Start Time: 2:24 PM    Type of service:  Video Visit    Video End Time:2:50 PM    Originating Location (pt. Location): Home        Distant Location (provider location):  Off-site    Platform used for Video Visit: [facility]              Outpatient Psychiatric Progress Note    Name: Marcela Harris   : 1995                    Primary Care Provider: Sol Talbot CNP   Therapist: None    PHQ-9 scores:  PHQ-9 SCORE 2021   PHQ-9 Total Score 10 3       JAISON-7 scores:  JAISON-7 SCORE 2021   Total Score 11 1       Patient Identification:    Patient is a 27 year old year old, single  White Not  or  female  who presents for return visit with me.  Patient is currently employed full time. Patient attended the session alone. Patient prefers to be called: \" Marcela\".    Current medications include: etonogestrel (NEXPLANON) 68 MG IMPL, 1 each by Subdermal route once  lamoTRIgine (LAMICTAL) 100 MG tablet, Take 1 tablet (100 mg) by mouth 2 times daily  methylphenidate (CONCERTA) 36 MG CR tablet, Take 1 tablet (36 mg) by mouth every morning  methylphenidate (CONCERTA) 36 MG CR tablet, Take 1 tablet (36 mg) by mouth every morning Fill August 15  methylphenidate (CONCERTA) 36 MG CR tablet, Take 1 tablet (36 mg) by mouth every morning Filled     No current facility-administered medications on file prior to visit.       The [place] Prescription Monitoring Program has been reviewed and there are no concerns about diversionary activity for controlled substances at this time.      I was able to review most recent Primary Care Provider, specialty provider, and therapy visit notes " that I have access to.     Today, patient reports that she is calling from work. No sleep concerns.  No nightmares.  She gets irritable rarely.  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  She socializes with less friends than she did in college.   She does not like to go to bars.  She has been making coworker friends. Asher wang records maximum heartbeat. at 110 BPM. When she first stopped strattera she felt lightheaded and foggy in her thinking.  Now that she has been taking the Concerta she has been getting tasks done, more motivatrd and consistent.  However, she is concerned that, at the end of the day - craving for sweet.  She is scheduled for a follow-up exam related to her seizure history.  She remembers first having seizures in  and it has been several years since her last seizure.  She takes Lamictal for this.       has a past medical history of Attention deficit hyperactivity disorder (ADHD), predominantly inattentive type, Contraceptive management, Gastroesophageal reflux disease without esophagitis, and Seizure disorder (H) (2000).    Social history updates:    Marcela lives with her parents and her sister.  She works full-time.  She has been overspending on items of on necessity.  Her mother is trying to help her learn to be more responsible in her spending habits.    Substance use updates:    No alcohol use reported  Tobacco use: No    Vital Signs:   There were no vitals taken for this visit.    Labs:    Most recent laboratory results reviewed and no new labs.     Mental Status Examination:  Appearance: awake, alert and casual dress  Attitude: cooperative  Eye Contact:  adequate  Gait and Station: No assistive Devices used and No dizziness or falls  Psychomotor Behavior:  intact station, gait and muscle tone  Oriented to:  time, person, and place  Attention Span and Concentration:  Normal  Speech:   clear, coherent and Speaks: English  Mood:  anxious and depressed  Affect:  mood  congruent  Associations:  no loose associations  Thought Process:  goal oriented  Thought Content:  no evidence of suicidal ideation or homicidal ideation, no auditory hallucinations present and no visual hallucinations present  Recent and Remote Memory:  intact Not formally assessed. No amnesia.  Fund of Knowledge: appropriate  Insight:  good  Judgment:  intact  Impulse Control:  intact    Suicide Risk Assessment:  Today Marcela Harris reports no thoughts to harm themself or others. In addition, there are notable risk factors for self-harm, including single status, anxiety and mood change. However, risk is mitigated by commitment to family, history of seeking help when needed, future oriented, denies suicidal intent or plan and denies homicidal ideation, intent, or plan. Therefore, based on all available evidence including the factors cited above, Marcela Harris does not appear to be at imminent risk for self-harm, does not meet criteria for a 72-hr hold, and therefore remains appropriate for ongoing outpatient level of care.  A thorough assessment of risk factors related to suicide and self-harm have been reviewed and are noted above. The patient convincingly denies suicidality on several occasions. Local community safety resources printed and reviewed for patient to use if needed. There was no deceit detected, and the patient presented in a manner that was believable.     DSM5 Diagnosis:  Attention-Deficit/Hyperactivity Disorder  314.00 (F90.0) Predominantly inattentive presentation  296.89 Bipolar II Disorder With mixed features and mild  300.02 (F41.1) Generalized Anxiety Disorder    Medical comorbidities include:   Patient Active Problem List    Diagnosis Date Noted     Attention deficit hyperactivity disorder (ADHD), predominantly inattentive type      Priority: Medium     Gastroesophageal reflux disease without esophagitis      Priority: Medium     Seizure disorder (H) 2000     Priority: Medium     TBI with  brain tumor         Assessment:    Marcela Harris reported in today that she likes taking the Concerta as it helps her stay more motivated, focused, and less distracted.  She is able to get her work done.  There is still some excessive spending going on and her mother is trying to help her control that.  Mara identified no sleeping concerns.  Concerta is going to continue for now but I urged her to let her neurologist know at their next visit in January about this medication.  We discussed increased risk for seizures and, since taking the Concerta, Loreta has had no seizures.  She was concerned about an elevated heart rate that ranges from 80 to 110 bpm according to her Fitbit.  I asked her also to monitor her blood pressure as she has a cuff at home that she can provide me with readings on a regular basis.  She reports no chest pain or headaches..    Medication side effects and alternatives were reviewed. Health promotion activities recommended and reviewed today. All questions addressed. Education and counseling completed regarding risks and benefits of medications and psychotherapy options.    Treatment Plan:        1.  Continue Concerta 36 mg daily- prescriptions written for October 24, November 21, and December 19    2.  Follow-up with your neurologist for seizure history    3.  Notify me seizure, or feel that a different dose of the Concerta is needed    4.  Strattera has already been discontinued    5.  Talk therapy, when able to, for processing and learning ways to cope with life stressors        Continue all other medications as reviewed per electronic medical record today.     Safety plan reviewed. To the Emergency Department as needed or call after hours crisis line at [phone] or [phone]. [place] Crisis Text [place]. Text [place] to 761286 or Suicide LifeLine Chat: suicidepreventionlifeline.org/chat/    To schedule individual or family therapy, call [facility] at  [phone]    Schedule an appointment with me in 3 months or sooner as needed. Call [facility] at [phone] to schedule.    Follow up with primary care provider as planned or for acute medical concerns.    Call the psychiatric nurse line with medication questions or concerns at [phone]    MyChart may be used to communicate with your provider, but this is not intended to be used for emergencies.    Crisis Resources:    National Suicide Prevention Lifeline: 560.798.5312 (TTY: 555.973.5515). Call anytime for help.  (www.suicidepreventionlifeline.org)  National [place] on Mental Illness (www.dante.org): 856.948.1521 or 793-891-6651.   Mental Health Association (www.mentalhealth.org): 962.867.5139 or 489-037-3895.  [place] Crisis Text [place]: Text [place] to 672052  Suicide LifeLine Chat: suicideDatadecisionline.org/chat    Administrative Billing:   Time spent with patient includes counseling and coordination of care regarding above diagnoses and treatment plan.    Patient Status:  Patient will continue to be seen for ongoing consultation and stabilization.    Signed:   BIJU Rebolledo-BC   Psychiatry

## 2022-10-24 NOTE — PATIENT INSTRUCTIONS
1.  Continue Concerta 36 mg daily- prescriptions written for October 24, November 21, and December 19  2.  Follow-up with your neurologist for seizure history  3.  Notify me seizure, or feel that a different dose of the Concerta is needed  4.  Strattera has already been discontinued  5.  Talk therapy, when able to, for processing and learning ways to cope with life stressors    Continue all other medications as reviewed per electronic medical record today.   Safety plan reviewed. To the Emergency Department as needed or call after hours crisis line at [phone] or [phone]. [place] Crisis Text [place]. Text [place] to 535327 or Suicide LifeLine Chat: [email or web address].[email or web address]/chat/  To schedule individual or family therapy, call [facility] at [phone]  Schedule an appointment with me in 3 months or sooner as needed. Call [facility] at [phone] to schedule.  Follow up with primary care provider as planned or for acute medical concerns.  Call the psychiatric nurse line with medication questions or concerns at [phone]  [employer] may be used to communicate with your provider, but this is not intended to be used for emergencies.    Crisis Resources:    National Suicide Prevention Lifeline: 877.326.1123 (TTY: 798.900.5384). Call anytime for help.  (www.suicidepreventionlifeline.org)  National [place] on Mental Illness (www.dante.org): 701.829.5305 or 153-346-5975.   Mental Health Association (www.mentalhealth.org): 340.635.6307 or 551-860-6801.  [place] Crisis [place]: Text [place] to 382143  Suicide LifeLine Chat: [employer].org/chat

## 2022-12-05 ENCOUNTER — TELEPHONE (OUTPATIENT)
Dept: PSYCHIATRY | Facility: CLINIC | Age: 27
End: 2022-12-05

## 2022-12-05 NOTE — TELEPHONE ENCOUNTER
Called patient and she said she was out of her Concerta. Patient said she has been taking every day.  Looked up on  and she last picked up on 10/26/22. She has a prescription for Nov and Dec at the pharmacy already. Left her a message that she should be able to  at [facility].    
Reason for call:  Other   Patient called regarding (reason for call): call back and prescription  Additional comments: pt is needing a refill until her appt 01/06/23 with Gely LANG. Pt also wait listed    Phone number to reach patient:  Home number on file [phone] (home)    Best Time:  asap    Can we leave a detailed message on this number?  YES    Travel screening: Not Applicable    
[employer] patent

## 2023-01-06 ENCOUNTER — VIRTUAL VISIT (OUTPATIENT)
Dept: PSYCHIATRY | Facility: CLINIC | Age: 28
End: 2023-01-06
Payer: COMMERCIAL

## 2023-01-06 DIAGNOSIS — F31.61 BIPOLAR DISORDER, CURRENT EPISODE MIXED, MILD (H): Primary | ICD-10-CM

## 2023-01-06 DIAGNOSIS — F90.0 ATTENTION DEFICIT HYPERACTIVITY DISORDER (ADHD), PREDOMINANTLY INATTENTIVE TYPE: ICD-10-CM

## 2023-01-06 PROCEDURE — 99214 OFFICE O/P EST MOD 30 MIN: CPT | Mod: 95 | Performed by: NURSE PRACTITIONER

## 2023-01-06 RX ORDER — METHYLPHENIDATE HYDROCHLORIDE 36 MG/1
36 TABLET ORAL EVERY MORNING
Qty: 30 TABLET | Refills: 0 | Status: SHIPPED | OUTPATIENT
Start: 2023-01-06 | End: 2023-01-17

## 2023-01-06 RX ORDER — ESCITALOPRAM OXALATE 5 MG/1
5 TABLET ORAL DAILY
Qty: 30 TABLET | Refills: 1 | Status: SHIPPED | OUTPATIENT
Start: 2023-01-06 | End: 2023-02-27

## 2023-01-06 NOTE — PROGRESS NOTES
"Marcela is a 27 year old who is being evaluated via a billable video visit.      How would you like to obtain your AVS? MyChart  If the video visit is dropped, the invitation should be resent by: Text to cell phone: [phone]  Will anyone else be joining your video visit? No        Video-Visit Details    Type of service:  Video Visit   Video Start Time: 1:24 PM  Video End Time:1:52 PM    Originating Location (pt. Location): Other In my car at work    Distant Location (provider location):  Off-site  Platform used for Video Visit: [facility]         Outpatient Psychiatric Progress Note    Name: Marcela Harris   : 1995                    Primary Care Provider: Sol Talbot CNP   Therapist: None    PHQ-9 scores:  PHQ-9 SCORE 2021   PHQ-9 Total Score 10 3       JAISON-7 scores:  JAISON-7 SCORE 2021   Total Score 11 1       Patient Identification:    Patient is a 27 year old year old, single  White Not  or  female  who presents for return visit with me.  Patient is currently employed part time. Patient attended the session alone. Patient prefers to be called: \" Marcela\".    Current medications include: etonogestrel (NEXPLANON) 68 MG IMPL, 1 each by Subdermal route once  lamoTRIgine (LAMICTAL) 100 MG tablet, Take 1 tablet (100 mg) by mouth 2 times daily  methylphenidate (CONCERTA) 36 MG CR tablet, Take 1 tablet (36 mg) by mouth every morning Fill   methylphenidate (CONCERTA) 36 MG CR tablet, Take 1 tablet (36 mg) by mouth every morning Fill   methylphenidate (CONCERTA) 36 MG CR tablet, Take 1 tablet (36 mg) by mouth every morning Filled     No current facility-administered medications on file prior to visit.       The [place] Prescription Monitoring Program has been reviewed and there are no concerns about diversionary activity for controlled substances at this time.      I was able to review most recent Primary Care Provider, specialty provider, and " therapy visit notes that I have access to.     Today, patient reports she has  respiratory symptoms.  .  She has no energu.  At work she is able to concentrate.   She tells me that her anxiety  Is  Manageable.  Depression is mild.  If she misses her medication and then takes it the next day, she feels better. She had her last seizure 23 years ago.   She finished psychological testing.  In [place] - She saw her father over the holidays.  Right now she lives with her mother - and step dad.  She does not have a main PCP.  Loreta informed me that her birth control device is over 3 years old.  She reports to me she is not sexually active and tells me she is not pregnant.     has a past medical history of Attention deficit hyperactivity disorder (ADHD), predominantly inattentive type, Contraceptive management, Gastroesophageal reflux disease without esophagitis, and Seizure disorder (H) (2000).    Social history updates:    No changes in her social history to report today    Substance use updates:    No alcohol use reported  Tobacco use: No    Vital Signs:   There were no vitals taken for this visit.    Labs:    Most recent laboratory results reviewed and no new labs.     Mental Status Examination:  Appearance: awake, alert, casual dress and mild distress  Attitude: cooperative  Eye Contact:  adequate  Gait and Station: No assistive Devices used and No dizziness or falls  Psychomotor Behavior:  intact station, gait and muscle tone  Oriented to:  time, person, and place  Attention Span and Concentration:  Normal  Speech:   clear, coherent and Speaks: English  Mood:  anxious and depressed  Affect:  mood congruent  Associations:  no loose associations  Thought Process:  goal oriented  Thought Content:  no evidence of suicidal ideation or homicidal ideation, no auditory hallucinations present and no visual hallucinations present  Recent and Remote Memory:  intact Not formally assessed. No amnesia.  Fund of Knowledge:  appropriate  Insight:  fair  Judgment:  fair  Impulse Control:  intact    Suicide Risk Assessment:  Today Marcela Harris reports no thoughts to harm themself or others. In addition, there are notable risk factors for self-harm, including single status, anxiety and mood change. However, risk is mitigated by commitment to family, history of seeking help when needed, future oriented, denies suicidal intent or plan and denies homicidal ideation, intent, or plan. Therefore, based on all available evidence including the factors cited above, Marcela Harris does not appear to be at imminent risk for self-harm, does not meet criteria for a 72-hr hold, and therefore remains appropriate for ongoing outpatient level of care.  A thorough assessment of risk factors related to suicide and self-harm have been reviewed and are noted above. The patient convincingly denies suicidality on several occasions. Local community safety resources printed and reviewed for patient to use if needed. There was no deceit detected, and the patient presented in a manner that was believable.     DSM5 Diagnosis:  Attention-Deficit/Hyperactivity Disorder  314.00 (F90.0) Predominantly inattentive presentation  296.89 Bipolar II Disorder With mixed features and mild    Medical comorbidities include:   Patient Active Problem List    Diagnosis Date Noted     Attention deficit hyperactivity disorder (ADHD), predominantly inattentive type      Priority: Medium     Gastroesophageal reflux disease without esophagitis      Priority: Medium     Seizure disorder (H) 2000     Priority: Medium     TBI with brain tumor         Assessment:    Marcela Harris reported today that her birth control device is over 3 years old.  I strongly urged her to seek out a primary care provider to have a wellness exam to evaluate this.  She reassures me she is not pregnant nor is she sexually active.  As long as she takes her medications, Loreta tells me she is mostly stable.  She did  agree to add Lexapro 5 mg daily to assist with breakthrough depression and mild anxiety symptoms.  She is to follow-up with her neurologist regarding the use of the lamotrigine twice daily, as she has not had any seizure activity for many years.  Concerta continues at 36 mg daily.  She is encouraged to seek talk therapy, when able to, to learn skills to manage life stressors.    Medication side effects and alternatives were reviewed. Health promotion activities recommended and reviewed today. All questions addressed. Education and counseling completed regarding risks and benefits of medications and psychotherapy options.    Treatment Plan:        1.  Lexapro 5 mg daily    2.  Lamotrigine 100 mg twice daily-follow-up with assessment by your neurologist    3.  Concerta 36 mg daily    4.  Make an appointment with your primary care provider as soon as possible for a wellness check and monitor effects of an  Nexplanon in you    5.  Talk therapy when able to, for managing life stressors        Continue all other medications as reviewed per electronic medical record today.     Safety plan reviewed. To the Emergency Department as needed or call after hours crisis line at [phone] or [phone]. [place] Crisis Text [place]. Text [place] to 451332 or Suicide LifeLine Chat: suicidepreventionlifeline.org/chat/    To schedule individual or family therapy, call [facility] at [phone]    Schedule an appointment with me in 6 weeks or sooner as needed. Call [facility] at [phone] to schedule.    Follow up with primary care provider as planned or for acute medical concerns.    Call the psychiatric nurse line with medication questions or concerns at [phone]    MyChart may be used to communicate with your provider, but this is not intended to be used for emergencies.    Crisis Resources:    National Suicide Prevention Lifeline: 525.380.7238 (TTY: 935.799.7866). Call anytime for  help.  (www.suicidepreventionlifeline.org)  National [place] on Mental Illness (www.dante.org): 556.745.3731 or 697-242-1058.   Mental Health Association (www.mentalhealth.org): 564.560.5206 or 473-766-9096.  [place] Crisis Text [place]: Text [place] to 084755  Suicide LifeLine Chat: suicideAffinity Systems.org/chat    Administrative Billing:   Time spent with patient includes counseling and coordination of care regarding above diagnoses and treatment plan.    Patient Status:  Patient will continue to be seen for ongoing consultation and stabilization.    Signed:   BIJU Rebolledo-BC   Psychiatry

## 2023-01-16 ENCOUNTER — TELEPHONE (OUTPATIENT)
Dept: PSYCHIATRY | Facility: CLINIC | Age: 28
End: 2023-01-16

## 2023-01-16 NOTE — TELEPHONE ENCOUNTER
Reason for call:  Other   Patient called regarding (reason for call): prescription and change pharmacies  Additional comments: pt wants her med sent to a different pharmacy urgently    [facility], [place]      Phone number to reach patient:  Home number on file [phone] (home)    Best Time:  Asap    Can we leave a detailed message on this number?  YES    Travel screening: Not Applicable

## 2023-01-21 NOTE — PATIENT INSTRUCTIONS
1.  Lexapro 5 mg daily  2.  Lamotrigine 100 mg twice daily-follow-up with assessment by your neurologist  3.  Concerta 36 mg daily  4.  Make an appointment with your primary care provider as soon as possible for a wellness check and monitor effects of an  Nexplanon in you  5.  Talk therapy when able to, for managing life stressors    Continue all other medications as reviewed per electronic medical record today.   Safety plan reviewed. To the Emergency Department as needed or call after hours crisis line at [phone] or [phone]. [place] Crisis Text [place]. Text [place] to 606595 or Suicide LifeLine Chat: [employer].[email or web address]/chat/  To schedule individual or family therapy, call [facility] at [phone]  Schedule an appointment with me in 6 weeks or sooner as needed. Call [facility] at [phone] to schedule.  Follow up with primary care provider as planned or for acute medical concerns.  Call the psychiatric nurse line with medication questions or concerns at [phone]  [employer] may be used to communicate with your provider, but this is not intended to be used for emergencies.    Crisis Resources:    National Suicide Prevention Lifeline: 255.891.7557 (TTY: 435.977.8082). Call anytime for help.  (www.suicidepreventionlifeline.org)  National [place] on Mental Illness (www.dante.org): 844.728.5600 or 802-379-8396.   Mental Health Association (www.mentalhealth.org): 341.485.3493 or 414-223-6329.  [place] Crisis Text [place]: Text [place] to 413006  Suicide LifeLine Chat: [employer].[email or web address]/chat

## 2023-02-27 DIAGNOSIS — F31.61 BIPOLAR DISORDER, CURRENT EPISODE MIXED, MILD (H): ICD-10-CM

## 2023-02-27 RX ORDER — ESCITALOPRAM OXALATE 5 MG/1
5 TABLET ORAL DAILY
Qty: 30 TABLET | Refills: 0 | Status: SHIPPED | OUTPATIENT
Start: 2023-02-27 | End: 2023-06-09 | Stop reason: DRUGHIGH

## 2023-02-27 NOTE — TELEPHONE ENCOUNTER
Psych providers are covering for WENDIE [facility]    Date of Last Office Visit: 23  Date of Next Office Visit: 2023  No shows since last visit: 0  Cancellations since last visit: 0    Medication requested:escitalopram (LEXAPRO) 5 MG tablet  Date last ordered: 2023 Qty: 30 Refills: 1     escitalopram (LEXAPRO) 5 MG tablet 30 tablet 1 2023  --   Sig - Route: Take 1 tablet (5 mg) by mouth daily - Oral   Sent to pharmacy as: Escitalopram Oxalate 5 MG Oral Tablet (LEXAPRO)   Class: E-Prescribe       Lapse in medication adherence greater than 5 days?: o  If yes, call patient and gather details: NA  Medication refill request verified as identical to current order?: yes  Result of Last DAM, VPA, Li+ Level, CBC, or Carbamazepine Level (at or since last visit): N/A    Last visit treatment plan:       1.  Lexapro 5 mg daily    2.  Lamotrigine 100 mg twice daily-follow-up with assessment by your neurologist    3.  Concerta 36 mg daily    4.  Make an appointment with your primary care provider as soon as possible for a wellness check and monitor effects of an  Nexplanon in you    5.  Talk therapy when able to, for managing life stressors      Schedule an appointment with me in 6 weeks or sooner as needed    []Medication refilled per  Medication Refill in Ambulatory Care  policy.  [x]Medication unable to be refilled by RN due to criteria not met as indicated below:    []Eligibility - not seen in the last year   []Supervision - no future appointment   []Compliance - no shows, cancellations or lapse in therapy   []Verification - order discrepancy   []Controlled medication   [x]Medication not included in policy   []90-day supply request   [x]Other : LPN is processing request

## 2023-03-26 ENCOUNTER — HEALTH MAINTENANCE LETTER (OUTPATIENT)
Age: 28
End: 2023-03-26

## 2023-04-07 ENCOUNTER — VIRTUAL VISIT (OUTPATIENT)
Dept: PSYCHIATRY | Facility: CLINIC | Age: 28
End: 2023-04-07
Payer: COMMERCIAL

## 2023-04-07 DIAGNOSIS — F31.61 BIPOLAR DISORDER, CURRENT EPISODE MIXED, MILD (H): Primary | ICD-10-CM

## 2023-04-07 DIAGNOSIS — F41.1 GAD (GENERALIZED ANXIETY DISORDER): ICD-10-CM

## 2023-04-07 DIAGNOSIS — F90.0 ATTENTION DEFICIT HYPERACTIVITY DISORDER (ADHD), PREDOMINANTLY INATTENTIVE TYPE: ICD-10-CM

## 2023-04-07 PROCEDURE — 99214 OFFICE O/P EST MOD 30 MIN: CPT | Mod: VID | Performed by: NURSE PRACTITIONER

## 2023-04-07 RX ORDER — ESCITALOPRAM OXALATE 10 MG/1
10 TABLET ORAL DAILY
Qty: 30 TABLET | Refills: 3 | Status: SHIPPED | OUTPATIENT
Start: 2023-04-07 | End: 2023-06-09

## 2023-04-07 RX ORDER — METHYLPHENIDATE HYDROCHLORIDE 27 MG/1
27 TABLET ORAL EVERY MORNING
Qty: 30 TABLET | Refills: 0 | Status: SHIPPED | OUTPATIENT
Start: 2023-04-07 | End: 2023-06-09

## 2023-04-07 RX ORDER — LAMOTRIGINE 100 MG/1
100 TABLET ORAL 2 TIMES DAILY
Qty: 30 TABLET | Refills: 3 | Status: SHIPPED | OUTPATIENT
Start: 2023-04-07 | End: 2023-06-09

## 2023-04-07 RX ORDER — METHYLPHENIDATE HYDROCHLORIDE 27 MG/1
27 TABLET ORAL EVERY MORNING
Qty: 30 TABLET | Refills: 0 | Status: SHIPPED | OUTPATIENT
Start: 2023-05-05 | End: 2023-06-09

## 2023-04-07 NOTE — NURSING NOTE
Is the patient currently in the state of [place]? YES - In pt's car, a couple of blocks away from home.    Visit mode:VIDEO    If the visit is dropped, the patient can be reconnected by: VIDEO VISIT:  Send e-mail to at [email or web address]    Will anyone else be joining the visit? No  (If patient encounters technical issues they should call [phone])    How would you like to obtain your AVS? MyChart    Are changes needed to the allergy or medication list? NO    Rooming Documentation: Care team has reviewed attendance agreement with patient. Patient advised that two failed appointments within 6 months may lead to termination of current episode of care.      Reason for visit: Medication follow up.    Mona Bowman, HARSHAF

## 2023-04-07 NOTE — PATIENT INSTRUCTIONS
1.  Increase Lexapro to 10 mg daily  2.  Decrease Concerta to 27 mg daily  3.  Continue lamotrigine 100 mg twice daily  4.  Please make an appointment with your neurologist to talk about your seizure history    Continue all other medications as reviewed per electronic medical record today.   Safety plan reviewed. To the Emergency Department as needed or call after hours crisis line at [phone] or [phone]. [place] Crisis Text [place]. Text [place] to 588214 or Suicide LifeLine Chat: suicideTriggit.[email or web address]/chat/  To schedule individual or family therapy, call [facility] at [phone]  Schedule an appointment with me in 2 months or sooner as needed. Call [facility] at [phone] to schedule.  Follow up with primary care provider as planned or for acute medical concerns.  Call the psychiatric nurse line with medication questions or concerns at [phone]  MyChart may be used to communicate with your provider, but this is not intended to be used for emergencies.    Crisis Resources:    National Suicide Prevention Lifeline: 143.958.4844 (TTY: 323.147.3762). Call anytime for help.  (www.suicidepreventionlifeline.org)  National [place] on Mental Illness (www.dante.org): 398.845.4301 or 297-942-9323.   Mental Health Association (www.mentalhealth.org): 612.807.3079 or 252-545-5319.  [place] Crisis Text [place]: Text [place] to 508821  Suicide LifeLine Chat: suicideTriggit.org/chat      Patient Education   The [facility]  What to Expect  Here's what to expect in the [facility] Psych[facility] Program.   About the program  You'll be meeting with a psychiatric doctor to check your mental health. A psychiatric doctor helps you deal with troubling thoughts and feelings by giving you medicine. They'll make sure you know the plan for your care. You may see them for a long time. When you're feeling better, they may refer you back to seeing your family doctor.   If you have  "any questions, we'll be glad to talk to you.  About visits  Be open  At your visits, please talk openly about your problems. It may feel hard, but it's the best way for us to help you.  Cancelling visits  If you can't come to your visit, please call us right away at [phone]. If you don't cancel at least 24 hours (1 full day) before your visit, that's \"late cancellation.\"  Not showing up for your visits  Being very late is the same as not showing up. You'll be a \"no show\" if:  You're more than 15 minutes late for a 30-minute (half hour) visit.  You're more than 30 minutes late for a 60-minute (full hour) visit.  If you cancel late or don't show up 2 times within 6 months, we may end your care.  Getting help between visits  If you need help between visits, you can call us Monday to Friday from 8 a.m. to 4:30 p.m. at [phone].  Emergency care  Call 911 or go to the nearest emergency department if your life or someone else's life is in danger.  Call 988 anytime to reach the national Suicide and Crisis hotline.  Medicine refills  To refill your medicine, call your pharmacy. You can also call [facility] at [phone], Monday to Friday, 8 a.m. to 4:30 p.m. It can take 1 to 3 business days to get a refill.   Forms, letters, and tests  You may have papers to fill out, like FMLA, short-term disability, and workability. We can help you with these forms at your visits, but you must have an appointment. You may need more than 1 visit for this, to be in an intensive therapy program, or both.  Before we can give you medicine for ADHD, we may refer you to get tested for it or confirm it another way.  We may not be able to give you an emotional support animal letter.  We don't do mental health checks ordered by the court.   We don't do mental health testing, but we can refer you to get tested.   Thank you for choosing us for your care.  For informational purposes only. Not to replace the " advice of your health care provider. Copyright   2022 [facility]. All rights reserved. [employer] [id number] - 12/22.

## 2023-04-07 NOTE — PROGRESS NOTES
"Virtual Visit Details    Type of service:  Video Visit   Video Start Time: 1:30 PM  Video End Time:1:55 PM    Originating Location (pt. Location): Other In pt's car, a couple of blocks away from home.    Distant Location (provider location):  Off-site  Platform used for Video Visit: [facility]                 Outpatient Psychiatric Progress Note    Name: Marcela Harris   : 1995                    Primary Care Provider: Sol Talbot CNP   Therapist: None    PHQ-9 scores:      2021    12:00 PM 2021     3:38 PM   PHQ-9 SCORE   PHQ-9 Total Score 10 3       JAISON-7 scores:      2021    12:00 PM 2021     3:38 PM   JAISON-7 SCORE   Total Score 11 1       Patient Identification:    Patient is a 28 year old year old, single  White Not  or  female  who presents for return visit with me.  Patient is currently employed full time. Patient attended the session alone. Patient prefers to be called: \" Marcela\".    Current medications include: escitalopram (LEXAPRO) 5 MG tablet, Take 1 tablet (5 mg) by mouth daily  etonogestrel (NEXPLANON) 68 MG IMPL, 1 each by Subdermal route once  lamoTRIgine (LAMICTAL) 100 MG tablet, Take 1 tablet (100 mg) by mouth 2 times daily  methylphenidate (CONCERTA) 36 MG CR tablet, Take 1 tablet (36 mg) by mouth every morning Fill     No current facility-administered medications on file prior to visit.       The [place] Prescription Monitoring Program has been reviewed and there are no concerns about diversionary activity for controlled substances at this time.      I was able to review most recent Primary Care Provider, specialty provider, and therapy visit notes that I have access to.     Today, patient reports that she spends most days working and then going to bed.  She is trying to save money as she wants to find another place to live.  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  She is concerned that the Lexapro is not lasting the whole day and she ends up " feeling the return of depression symptoms.  Several family members have been diagnosed with depression also.  She finds that transitioning into adulthood is difficult which increases her depressed mood.  Her parents are wanting her to move out of their house.  She has been arguing a lot with her stepfather.       has a past medical history of Attention deficit hyperactivity disorder (ADHD), predominantly inattentive type, Contraceptive management, Gastroesophageal reflux disease without esophagitis, and Seizure disorder (H) (2000).    Social history updates:    Marcela is living with her mother and stepfather.  She has financial stressors as she tries to save up money to move out and live on her own.    Substance use updates:    No alcohol use reported  Tobacco use: No    Vital Signs:   There were no vitals taken for this visit.    Labs:    Most recent laboratory results reviewed and no new labs.     Mental Status Examination:  Appearance: awake, alert, casual dress and mild distress  Attitude: cooperative  Eye Contact:  adequate  Gait and Station: No dizziness or falls  Psychomotor Behavior:  intact station, gait and muscle tone  Oriented to:  time, person, and place  Attention Span and Concentration:  Normal  Speech:   vtspeech: clear, coherent and Speaks: English  Mood:  : anxious, depressed and labile  Affect:  : mood congruent  Associations:  no loose associations  Thought Process:  goal oriented  Thought Content:  no evidence of suicidal ideation or homicidal ideation, no auditory hallucinations present and no visual hallucinations present  Recent and Remote Memory:  intact Not formally assessed. No amnesia.  Fund of Knowledge: appropriate  Insight:  good  Judgment: good  Impulse Control:  good    Suicide Risk Assessment:  Today Marcela Harris reports no thoughts to harm themself or others. In addition, there are notable risk factors for self-harm, including single status, anxiety and mood change. However, risk is  mitigated by history of seeking help when needed, future oriented, denies suicidal intent or plan and denies homicidal ideation, intent, or plan. Therefore, based on all available evidence including the factors cited above, Marcela Harris does not appear to be at imminent risk for self-harm, does not meet criteria for a 72-hr hold, and therefore remains appropriate for ongoing outpatient level of care.  A thorough assessment of risk factors related to suicide and self-harm have been reviewed and are noted above. The patient convincingly denies suicidality on several occasions. Local community safety resources printed and reviewed for patient to use if needed. There was no deceit detected, and the patient presented in a manner that was believable.     DSM5 Diagnosis:  Attention-Deficit/Hyperactivity Disorder  314.00 (F90.0) Predominantly inattentive presentation  296.89 Bipolar II Disorder With mixed features and mild  300.02 (F41.1) Generalized Anxiety Disorder    Medical comorbidities include:   Patient Active Problem List    Diagnosis Date Noted     Attention deficit hyperactivity disorder (ADHD), predominantly inattentive type      Priority: Medium     Gastroesophageal reflux disease without esophagitis      Priority: Medium     Seizure disorder (H) 2000     Priority: Medium     TBI with brain tumor         Assessment:    Marcela Harris is struggling to achieve funding to move out of her parents home to live on her own.  She has been having more depression lately with irritability.  Today I decreased her Concerta to 27 mg daily.  Lexapro was increased to 10 mg daily.  She will continue with the lamotrigine 100 mg twice daily for mood regulation and we discussed how she is recommended to see a neurologist to talk about her seizure history.  She has not had any seizures since beginning care with me.  She denies any suicide thinking..    Medication side effects and alternatives were reviewed. Health promotion activities  recommended and reviewed today. All questions addressed. Education and counseling completed regarding risks and benefits of medications and psychotherapy options.    Treatment Plan:        1.  Increase Lexapro to 10 mg daily    2.  Decrease Concerta to 27 mg daily    3.  Continue lamotrigine 100 mg twice daily    4.  Please make an appointment with your neurologist to talk about your seizure history        Continue all other medications as reviewed per electronic medical record today.     Safety plan reviewed. To the Emergency Department as needed or call after hours crisis line at [phone] or [phone]. [place] Crisis Text [place]. Text [place] to 738226 or Suicide LifeLine Chat: suicide[facility] Centers.org/chat/    To schedule individual or family therapy, call [facility] at [phone]    Schedule an appointment with me in 2 months or sooner as needed. Call [facility] at [phone] to schedule.    Follow up with primary care provider as planned or for acute medical concerns.    Call the psychiatric nurse line with medication questions or concerns at [phone]    MyChart may be used to communicate with your provider, but this is not intended to be used for emergencies.    Crisis Resources:    National Suicide Prevention Lifeline: 252.913.1977 (TTY: 602.975.7250). Call anytime for help.  (www.suicidepreventionlifeline.org)  National [place] on Mental Illness (www.dante.org): 443.974.4648 or 985-751-6808.   Mental Health Association (www.mentalhealth.org): 670.722.1960 or 936-217-3302.  [place] Crisis Text [place]: Text [place] to 146173  Suicide LifeLine Chat: suicideC7 Data Centers.org/chat    Administrative Billing:   Time spent with patient includes counseling and coordination of care regarding above diagnoses and treatment plan.    Patient Status:  This is a continuous care patient and medications will be prescribed by the psychiatric provider until further  indicated.    Signed:   Gely Salas, [facility]   Psychiatry

## 2023-06-09 ENCOUNTER — VIRTUAL VISIT (OUTPATIENT)
Dept: PSYCHIATRY | Facility: CLINIC | Age: 28
End: 2023-06-09
Payer: COMMERCIAL

## 2023-06-09 DIAGNOSIS — F90.0 ATTENTION DEFICIT HYPERACTIVITY DISORDER (ADHD), PREDOMINANTLY INATTENTIVE TYPE: ICD-10-CM

## 2023-06-09 DIAGNOSIS — F31.61 BIPOLAR DISORDER, CURRENT EPISODE MIXED, MILD (H): Primary | ICD-10-CM

## 2023-06-09 DIAGNOSIS — F41.1 GAD (GENERALIZED ANXIETY DISORDER): ICD-10-CM

## 2023-06-09 PROCEDURE — 99214 OFFICE O/P EST MOD 30 MIN: CPT | Mod: VID | Performed by: NURSE PRACTITIONER

## 2023-06-09 RX ORDER — LAMOTRIGINE 100 MG/1
100 TABLET ORAL 2 TIMES DAILY
Qty: 30 TABLET | Refills: 3 | Status: SHIPPED | OUTPATIENT
Start: 2023-06-09 | End: 2023-09-15

## 2023-06-09 RX ORDER — METHYLPHENIDATE HYDROCHLORIDE 27 MG/1
27 TABLET ORAL EVERY MORNING
Qty: 30 TABLET | Refills: 0 | Status: SHIPPED | OUTPATIENT
Start: 2023-07-07 | End: 2023-09-15

## 2023-06-09 RX ORDER — METHYLPHENIDATE HYDROCHLORIDE 27 MG/1
27 TABLET ORAL EVERY MORNING
Qty: 30 TABLET | Refills: 0 | Status: SHIPPED | OUTPATIENT
Start: 2023-06-09 | End: 2023-07-27

## 2023-06-09 RX ORDER — ESCITALOPRAM OXALATE 10 MG/1
10 TABLET ORAL DAILY
Qty: 30 TABLET | Refills: 3 | Status: SHIPPED | OUTPATIENT
Start: 2023-06-09 | End: 2023-09-28

## 2023-06-09 NOTE — NURSING NOTE
Is the patient currently in the state of [place]? YES    Visit mode:VIDEO    If the visit is dropped, the patient can be reconnected by: VIDEO VISIT: Text to cell phone:   Telephone Information:   Mobile [phone]       Will anyone else be joining the visit? No  (If patient encounters technical issues they should call [phone])    How would you like to obtain your AVS? MyChart    Are changes needed to the allergy or medication list? YES pt marked meds for removal. Also not taking 5 mg of lexapro    Rooming Documentation: Questionnaire(s) not assigned, not done per department protocol. and Attendance Guidelines - Care team has reviewed attendance agreement with patient. Patient advised that two failed appointments within 6 months may lead to termination of current episode of care.      Reason for visit: RECHECK     RIVER Nguyen

## 2023-06-09 NOTE — PROGRESS NOTES
"Virtual Visit Details    Type of service:  Video Visit   Video Start Time: 2:28 PM  Video End Time:2:55 PM    Originating Location (pt. Location): Home    Distant Location (provider location):  Off-site  Platform used for Video Visit: [facility]           Outpatient Psychiatric Progress Note    Name: Marcela Harris   : 1995                    Primary Care Provider: Sol Talbot CNP   Therapist: None    PHQ-9 scores:      2021    12:00 PM 2021     3:38 PM   PHQ-9 SCORE   PHQ-9 Total Score 10 3       JAISON-7 scores:      2021    12:00 PM 2021     3:38 PM   JAISON-7 SCORE   Total Score 11 1       Patient Identification:    Patient is a 28 year old year old, single  White Not  or  female  who presents for return visit with me.  Patient is currently employed full time. Patient attended the session alone. Patient prefers to be called: \" Marcela\".    Current medications include: escitalopram (LEXAPRO) 10 MG tablet, Take 1 tablet (10 mg) by mouth daily  escitalopram (LEXAPRO) 5 MG tablet, Take 1 tablet (5 mg) by mouth daily  etonogestrel (NEXPLANON) 68 MG IMPL, 1 each by Subdermal route once  lamoTRIgine (LAMICTAL) 100 MG tablet, Take 1 tablet (100 mg) by mouth 2 times daily  methylphenidate (CONCERTA) 27 MG CR tablet, Take 1 tablet (27 mg) by mouth every morning  methylphenidate (CONCERTA) 27 MG CR tablet, Take 1 tablet (27 mg) by mouth every morning Fill May 5  methylphenidate (CONCERTA) 36 MG CR tablet, Take 1 tablet (36 mg) by mouth every morning Fill     No current facility-administered medications on file prior to visit.       The [place] Prescription Monitoring Program has been reviewed and there are no concerns about diversionary activity for controlled substances at this time.      I was able to review most recent Primary Care Provider, specialty provider, and therapy visit notes that I have access to.     Today, patient reports that she left work early today to prepare for " "a camping trip.  Overall, she reports feeling \"better\" since starting the depression medication.  She has kept on a routine.  Sleeping at night.   She feels kind of tired.    Not a lot of social time.  Working in retail.  Since taking escitalopram , she has felt happier - does not last the whole day.   She weighs 144 pounds now.  She has been eating a lot of sweets.  She has bought some self help books.  While she has been a little down on herself, she reports having less intense negative self thoughts.       has a past medical history of Attention deficit hyperactivity disorder (ADHD), predominantly inattentive type, Contraceptive management, Gastroesophageal reflux disease without esophagitis, and Seizure disorder (H) (2000).    Social history updates:    Marcela lives with her family.    Substance use updates:    Vague about alcohol use  Tobacco use: No    Vital Signs:   There were no vitals taken for this visit.    Labs:    Most recent laboratory results reviewed and no new labs.     Mental Status Examination:  Appearance: awake, alert and casual dress  Attitude: cooperative  Eye Contact:  adequate  Gait and Station: No dizziness or falls  Psychomotor Behavior:  no evidence of tardive dyskinesia, dystonia, or tics, fidgeting and intact station, gait and muscle tone  Oriented to:  time, person, and place  Attention Span and Concentration:  Fair  Speech:   vtspeech: clear, coherent and Speaks: English  Mood:  : anxious, depressed and labile  Affect:  : mood congruent  Associations:  no loose associations  Thought Process:  goal oriented  Thought Content:  no evidence of suicidal ideation or homicidal ideation, no auditory hallucinations present and no visual hallucinations present  Recent and Remote Memory:  intact Not formally assessed. No amnesia.  Fund of Knowledge: appropriate  Insight:  good  Judgment: good  Impulse Control:  good    Suicide Risk Assessment:  Today Marcela Harris reports no thoughts to harm " themself or others. In addition, there are notable risk factors for self-harm, including single status, anxiety and mood change. However, risk is mitigated by commitment to family, history of seeking help when needed, future oriented, denies suicidal intent or plan and denies homicidal ideation, intent, or plan. Therefore, based on all available evidence including the factors cited above, Marcela Harris does not appear to be at imminent risk for self-harm, does not meet criteria for a 72-hr hold, and therefore remains appropriate for ongoing outpatient level of care.  A thorough assessment of risk factors related to suicide and self-harm have been reviewed and are noted above. The patient convincingly denies suicidality on several occasions. Local community safety resources printed and reviewed for patient to use if needed. There was no deceit detected, and the patient presented in a manner that was believable.     DSM5 Diagnosis:  Attention-Deficit/Hyperactivity Disorder  314.00 (F90.0) Predominantly inattentive presentation  296.89 Bipolar II Disorder With mixed features and mild  300.02 (F41.1) Generalized Anxiety Disorder    Medical comorbidities include:   Patient Active Problem List    Diagnosis Date Noted     Attention deficit hyperactivity disorder (ADHD), predominantly inattentive type      Priority: Medium     Gastroesophageal reflux disease without esophagitis      Priority: Medium     Seizure disorder (H) 2000     Priority: Medium     TBI with brain tumor         Assessment:    Marcela Harris reports that she is feeling more stable and less depressed since starting the Lexapro.  She will continue taking the Lexapro at 10 mg daily.  Concerta is effective in managing her attentional difficulties.  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  Lamotrigine continues at 100 mg 2 times daily to help with mood stabilization.  She reports no negative side effects  from the medications in general.  We talked once again about the removal of a Nexplanon as she informed that now she feels like the medication has moved around in her arm.  Information was given to her to contact [facility] to help with removal of the device..    Medication side effects and alternatives were reviewed. Health promotion activities recommended and reviewed today. All questions addressed. Education and counseling completed regarding risks and benefits of medications and psychotherapy options.    Treatment Plan:        1.  Lexapro 10 mg daily    2.  Lamotrigine 100 mg 2 times daily    3.  Methylphenidate 27 mg daily    4.  Talk therapy, when able to, for learning skills to manage life stressors        Continue all other medications as reviewed per electronic medical record today.     Safety plan reviewed. To the Emergency Department as needed or call after hours crisis line at [phone] or [phone]. [place] Crisis Text Line. Text [place] to 027540 or Suicide LifeLine Chat: suicidepreventionBlitzLocalline.org/chat/    To schedule individual or family therapy, call [facility] at [phone]    Schedule an appointment with me in 6 weeks or sooner as needed. Call [facility] at [phone] to schedule.    Follow up with primary care provider as planned or for acute medical concerns.    Call the psychiatric nurse line with medication questions or concerns at [phone]    MyChart may be used to communicate with your provider, but this is not intended to be used for emergencies.    Crisis Resources:    National Suicide Prevention Lifeline: 705.176.3645 (TTY: 929.687.1881). Call anytime for help.  (www.suicidepreventionlifeline.org)  National [place] on Mental Illness (www.dante.org): 164.788.5261 or 386-315-3369.   Mental Health Association (www.mentalhealth.org): 641.812.6322 or 328-758-6342.  [place] Crisis Text Line: Text [place] to 677738  Suicide  LifeLine Chat: suicidepreventionlifeline.org/chat    Administrative Billing:   Time spent with patient includes counseling and coordination of care regarding above diagnoses and treatment plan.    Patient Status:  This is a continuous care patient and medications will be prescribed by the psychiatric provider until further indicated.    Signed:   BIJU Rebolledo-BC   Psychiatry

## 2023-06-22 NOTE — PATIENT INSTRUCTIONS
"Patient Education   The Panel Psychiatry Program  What to Expect  Here's what to expect in the Panel Psychiatry Program.   About the program  You'll be meeting with a psychiatric doctor to check your mental health. A psychiatric doctor helps you deal with troubling thoughts and feelings by giving you medicine. They'll make sure you know the plan for your care. You may see them for a long time. When you're feeling better, they may refer you back to seeing your family doctor.   If you have any questions, we'll be glad to talk to you.  About visits  Be open  At your visits, please talk openly about your problems. It may feel hard, but it's the best way for us to help you.  Cancelling visits  If you can't come to your visit, please call us right away at [phone]. If you don't cancel at least 24 hours (1 full day) before your visit, that's \"late cancellation.\"  Not showing up for your visits  Being very late is the same as not showing up. You'll be a \"no show\" if:  You're more than 15 minutes late for a 30-minute (half hour) visit.  You're more than 30 minutes late for a 60-minute (full hour) visit.  If you cancel late or don't show up 2 times within 6 months, we may end your care.  Getting help between visits  If you need help between visits, you can call us Monday to Friday from 8 a.m. to 4:30 p.m. at [phone].  Emergency care  Call 911 or go to the nearest emergency department if your life or someone else's life is in danger.  Call 988 anytime to reach the national Suicide and Crisis hotline.  Medicine refills  To refill your medicine, call your pharmacy. You can also call [facility]'s [facility] at [phone], Monday to Friday, 8 a.m. to 4:30 p.m. It can take 1 to 3 business days to get a refill.   Forms, letters, and tests  You may have papers to fill out, like FMLA, short-term disability, and workability. We can help you with these forms at your visits, but you must have an " appointment. You may need more than 1 visit for this, to be in an intensive therapy program, or both.  Before we can give you medicine for ADHD, we may refer you to get tested for it or confirm it another way.  We may not be able to give you an emotional support animal letter.  We don't do mental health checks ordered by the court.   We don't do mental health testing, but we can refer you to get tested.   Thank you for choosing us for your care.  For informational purposes only. Not to replace the advice of your health care provider. Copyright   2022 [facility]. All rights reserved. Faculte [id number] - 12/22.         Treatment Plan:      1.  Lexapro 10 mg daily  2.  Lamotrigine 100 mg 2 times daily  3.  Methylphenidate 27 mg daily  4.  Talk therapy, when able to, for learning skills to manage life stressors    Continue all other medical directions per primary care provider.   Continue all other medications as reviewed per electronic medical record today.   Safety plan reviewed. To the Emergency Department as needed or call after hours crisis line at [phone] or [phone]. [place] Crisis Text Line: Text [place] to 701828  or  Suicide LifeLine Chat: suicidepreventionWomen of Coffeeline.org/chat/  To schedule individual or family therapy, call [facility] at [phone].   Schedule an appointment with me in 6 weeks or sooner as needed.  Call [facility] at [phone] to schedule.  Follow up with primary care provider as planned or for acute medical concerns.  Call the psychiatric nurse line with medication questions or concerns at [phone].  [employer] [employer] may be used to communicate with your provider, but this is not intended to be used for emergencies.    Crisis Resources:    National Suicide Prevention Lifeline: 752.129.3488 (TTY: 529.995.9421). Call anytime for help.  (www.suicidepreventionlifeline.org)  National [place] on Mental Illness (www.dante.org): 423.886.5562 or  613.967.9316.   Mental Health Association (www.mentalhealth.org): 670-722-5667 or 635-067-4975.  [place] Crisis Text Line: Text [place] to 499866  Suicide LifeLine Chat: suicidepreventionlifeline.org/chat

## 2023-07-10 ENCOUNTER — TELEPHONE (OUTPATIENT)
Dept: PSYCHIATRY | Facility: CLINIC | Age: 28
End: 2023-07-10

## 2023-07-10 NOTE — TELEPHONE ENCOUNTER
RN review medical record and it looks like there should be a refill of concerta and 3 refills of lexapro available at [facility] in [place]. RN called the patient at [phone] and spoke with her. RN encouraged her to contact her pharmacy to see if refills were available. She stated that she would do so and call us back if she needed a refill.     ARGELIA DAMIAN RN on 7/10/2023 at 2:26 PM

## 2023-07-10 NOTE — TELEPHONE ENCOUNTER
Reason for Call:  Medication or medication refill:    Pharmacy: [facility]  Phone: [phone]    -If out of stock please try-  Pharmacy: [facility]  Phone: [phone]      Name of the medication requested:   - methylphenidate (CONCERTA) 27 MG CR tablet  - escitalopram (LEXAPRO) 10 MG tablet      Other request: Please bridge until 8/18/2023 appointment     Can we leave a detailed message on this number? YES    Phone number patient can be reached at: Cell number on file:    Telephone Information:   Mobile [phone]       Best Time: ASAP    Call taken on 7/10/2023 at 11:56 AM by Estrella Schrader

## 2023-07-27 ENCOUNTER — VIRTUAL VISIT (OUTPATIENT)
Dept: PSYCHIATRY | Facility: CLINIC | Age: 28
End: 2023-07-27
Payer: COMMERCIAL

## 2023-07-27 DIAGNOSIS — F90.0 ATTENTION DEFICIT HYPERACTIVITY DISORDER (ADHD), PREDOMINANTLY INATTENTIVE TYPE: ICD-10-CM

## 2023-07-27 DIAGNOSIS — F31.61 BIPOLAR DISORDER, CURRENT EPISODE MIXED, MILD (H): Primary | ICD-10-CM

## 2023-07-27 DIAGNOSIS — F41.1 GAD (GENERALIZED ANXIETY DISORDER): ICD-10-CM

## 2023-07-27 PROCEDURE — 99214 OFFICE O/P EST MOD 30 MIN: CPT | Mod: VID | Performed by: NURSE PRACTITIONER

## 2023-07-27 ASSESSMENT — PATIENT HEALTH QUESTIONNAIRE - PHQ9
10. IF YOU CHECKED OFF ANY PROBLEMS, HOW DIFFICULT HAVE THESE PROBLEMS MADE IT FOR YOU TO DO YOUR WORK, TAKE CARE OF THINGS AT HOME, OR GET ALONG WITH OTHER PEOPLE: SOMEWHAT DIFFICULT
SUM OF ALL RESPONSES TO PHQ QUESTIONS 1-9: 5
SUM OF ALL RESPONSES TO PHQ QUESTIONS 1-9: 5

## 2023-07-27 NOTE — PATIENT INSTRUCTIONS
"Patient Education   The Panel Psychiatry Program  What to Expect  Here's what to expect in the Panel Psychiatry Program.   About the program  You'll be meeting with a psychiatric doctor to check your mental health. A psychiatric doctor helps you deal with troubling thoughts and feelings by giving you medicine. They'll make sure you know the plan for your care. You may see them for a long time. When you're feeling better, they may refer you back to seeing your family doctor.   If you have any questions, we'll be glad to talk to you.  About visits  Be open  At your visits, please talk openly about your problems. It may feel hard, but it's the best way for us to help you.  Cancelling visits  If you can't come to your visit, please call us right away at [phone]. If you don't cancel at least 24 hours (1 full day) before your visit, that's \"late cancellation.\"  Not showing up for your visits  Being very late is the same as not showing up. You'll be a \"no show\" if:  You're more than 15 minutes late for a 30-minute (half hour) visit.  You're more than 30 minutes late for a 60-minute (full hour) visit.  If you cancel late or don't show up 2 times within 6 months, we may end your care.  Getting help between visits  If you need help between visits, you can call us Monday to Friday from 8 a.m. to 4:30 p.m. at [phone].  Emergency care  Call 911 or go to the nearest emergency department if your life or someone else's life is in danger.  Call 988 anytime to reach the national Suicide and Crisis hotline.  Medicine refills  To refill your medicine, call your pharmacy. You can also call [facility]'s Behavioral Access at [phone], Monday to Friday, 8 a.m. to 4:30 p.m. It can take 1 to 3 business days to get a refill.   Forms, letters, and tests  You may have papers to fill out, like FMLA, short-term disability, and workability. We can help you with these forms at your visits, but you must have an " appointment. You may need more than 1 visit for this, to be in an intensive therapy program, or both.  Before we can give you medicine for ADHD, we may refer you to get tested for it or confirm it another way.  We may not be able to give you an emotional support animal letter.  We don't do mental health checks ordered by the court.   We don't do mental health testing, but we can refer you to get tested.   Thank you for choosing us for your care.  For informational purposes only. Not to replace the advice of your health care provider. Copyright   2022 [facility] [employer]. All rights reserved. [employer] [id number] - 12/22.      Treatment Plan:      1.  Continue methylphenidate 27 mg daily  2.  Continue lamotrigine 100 mg 2 times daily  3.  Increase Lexapro to 15 mg daily      Continue all other medical directions per primary care provider.   Continue all other medications as reviewed per electronic medical record today.   Safety plan reviewed. To the Emergency Department as needed or call after hours crisis line at [phone] or [phone]. [place] Crisis Text [place]: Text [place] to 349933  or  Suicide LifeLine Chat: suicidepreventionlifeline.org/chat/  To schedule individual or family therapy, call [facility] at [phone].   Schedule an appointment with me in 8 weeks or sooner as needed.  Call [facility] at [phone] to schedule.  Follow up with primary care provider as planned or for acute medical concerns.  Call the psychiatric nurse line with medication questions or concerns at [phone].  [employer] may be used to communicate with your provider, but this is not intended to be used for emergencies.    Crisis Resources:    National Suicide Prevention Lifeline: 123.403.8326 (TTY: 514.708.2101). Call anytime for help.  (www.suicidepreventionlifeline.org)  National [place] on Mental Illness (www.dante.org): 609.663.4548 or 319-824-3354.   Mental Health Association  (www.mentalhealth.org): 426-391-6250 or 506-501-1977.  [place] Crisis Text Line: Text [place] to 293149  Suicide LifeLine Chat: suicidepreventionlifeline.org/chat

## 2023-07-27 NOTE — NURSING NOTE
Is the patient currently in the state of [place]? YES    Visit mode:VIDEO    If the visit is dropped, the patient can be reconnected by: VIDEO VISIT: Text to cell phone: [phone]    Will anyone else be joining the visit? NO      How would you like to obtain your AVS? MyChart    Are changes needed to the allergy or medication list? NO    Reason for visit: RECHECK

## 2023-07-27 NOTE — PROGRESS NOTES
Virtual Visit Details    Type of service:  Video Visit   Video Start Time:  10:45 AM  Video End Time: 1:15 AM    Originating Location (pt. Location): Home    Distant Location (provider location):  On-site  Platform used for Video Visit: SiTune submitted by the patient for this visit:  Patient Health Questionnaire (Submitted on 7/27/2023)  If you checked off any problems, how difficult have these problems made it for you to do your work, take care of things at home, or get along with other people?: Somewhat difficult  PHQ9 TOTAL SCORE: 5

## 2023-07-27 NOTE — PROGRESS NOTES
"         Outpatient Psychiatric Progress Note    Name: Marcela Harris   : 1995                    Primary Care Provider: Sol Talbot CNP   Therapist: None    PHQ-9 scores:      2021    12:00 PM 2021     3:38 PM   PHQ-9 SCORE   PHQ-9 Total Score 10 3       JAISON-7 scores:      2021    12:00 PM 2021     3:38 PM   JAISON-7 SCORE   Total Score 11 1       Patient Identification:    Patient is a 28 year old year old, single  White Not  or  female  who presents for return visit with me.  Patient is currently employed full time. Patient attended the session alone. Patient prefers to be called: \" Marcela\".    Current medications include: escitalopram (LEXAPRO) 10 MG tablet, Take 1 tablet (10 mg) by mouth daily  etonogestrel (NEXPLANON) 68 MG IMPL, 1 each by Subdermal route once  lamoTRIgine (LAMICTAL) 100 MG tablet, Take 1 tablet (100 mg) by mouth 2 times daily  methylphenidate (CONCERTA) 27 MG CR tablet, Take 1 tablet (27 mg) by mouth every morning Fill   methylphenidate (CONCERTA) 27 MG CR tablet, Take 1 tablet (27 mg) by mouth every morning Fill on or after     No current facility-administered medications on file prior to visit.       The [place] Prescription Monitoring Program has been reviewed and there are no concerns about diversionary activity for controlled substances at this time.      I was able to review most recent Primary Care Provider, specialty provider, and therapy visit notes that I have access to.     Today, patient reports things are better.  She is feeling a lot of weight off of her chest for half the day.  Things feel down and depressed and irritable.  Some crying when triggered.  She is in transition to move out of parents home and into her own apartment.  Good appetite  - weight stable - trying to lose weight - eating healthier.  Not tired during the day any more - sleeping better.  Not thinking of emergency scenarios as much - end times.       has a " past medical history of Attention deficit hyperactivity disorder (ADHD), predominantly inattentive type, Contraceptive management, Gastroesophageal reflux disease without esophagitis, and Seizure disorder (H) (2000).    Social history updates:    Specific friends that she stays in touch with.    Substance use updates:    No alcohol use reported  Tobacco use: Yes  vaping non nicotine products   Ready to quit?  No  Nicotine Replacement Therapy tried: none     Vital Signs:   There were no vitals taken for this visit.    Labs:    Most recent laboratory results reviewed and no new labs.     Mental Status Examination:  Appearance: awake, alert and casual dress  Attitude: cooperative  Eye Contact:  adequate  Gait and Station: No dizziness or falls  Psychomotor Behavior:  intact station, gait and muscle tone  Oriented to:  time, person, and place  Attention Span and Concentration:  Normal  Speech:   vtspeech: clear, coherent and Speaks: English  Mood:  anxious and depressed  Affect:  mood congruent  Associations:  no loose associations  Thought Process:  goal oriented  Thought Content:  no evidence of suicidal ideation or homicidal ideation, no auditory hallucinations present, and no visual hallucinations present  Recent and Remote Memory:  intact Not formally assessed. No amnesia.  Fund of Knowledge: appropriate  Insight:  good  Judgment: good  Impulse Control:  good    Suicide Risk Assessment:  Today Marcela Harris reports no thoughts to harm themself or others. In addition, there are notable risk factors for self-harm, including single status, anxiety, and mood change. However, risk is mitigated by commitment to family, history of seeking help when needed, future oriented, denies suicidal intent or plan, and denies homicidal ideation, intent, or plan. Therefore, based on all available evidence including the factors cited above, Marcela Harris does not appear to be at imminent risk for self-harm, does not meet criteria for a  72-hr hold, and therefore remains appropriate for ongoing outpatient level of care.  A thorough assessment of risk factors related to suicide and self-harm have been reviewed and are noted above. The patient convincingly denies suicidality on several occasions. Local community safety resources printed and reviewed for patient to use if needed. There was no deceit detected, and the patient presented in a manner that was believable.     DSM5 Diagnosis:  Attention-Deficit/Hyperactivity Disorder  314.00 (F90.0) Predominantly inattentive presentation  296.89 Bipolar II Disorder With mixed features and mild  300.02 (F41.1) Generalized Anxiety Disorder    Medical comorbidities include:   Patient Active Problem List    Diagnosis Date Noted    Attention deficit hyperactivity disorder (ADHD), predominantly inattentive type      Priority: Medium    Gastroesophageal reflux disease without esophagitis      Priority: Medium    Seizure disorder (H) 2000     Priority: Medium     TBI with brain tumor           Assessment:    Marcela Harris reports that she is feeling less depressed and anxious when taking the Lexapro.  She was agreeable to another increase to 15 mg daily to better manage her symptoms as they relate to work stress and preparing for the future where she wants to live in her own apartment.  She will continue lamotrigine for mood stabilization twice daily.  Methylphenidate continues as she reports being better able to stay organized and focused at work and at home.  She is having a more positive attitude toward life in general and talk therapies are recommended to continue this process.    Medication side effects and alternatives were reviewed. Health promotion activities recommended and reviewed today. All questions addressed. Education and counseling completed regarding risks and benefits of medications and psychotherapy options.    Treatment Plan:      1.  Continue methylphenidate 27 mg daily  2.  Continue lamotrigine  100 mg 2 times daily  3.  Increase Lexapro to 15 mg daily    Continue all other medications as reviewed per electronic medical record today.   Safety plan reviewed. To the Emergency Department as needed or call after hours crisis line at [phone] or [phone]. [place] Crisis Text [place]. Text [place] to 832311 or Suicide LifeLine Chat: suicideMedStartr.org/chat/  To schedule individual or family therapy, call [facility] at [phone]  Schedule an appointment with me in 2 months or sooner as needed. Call [facility] at [phone] to schedule.  Follow up with primary care provider as planned or for acute medical concerns.  Call the psychiatric nurse line with medication questions or concerns at [phone]  MyChart may be used to communicate with your provider, but this is not intended to be used for emergencies.    Crisis Resources:    National Suicide Prevention Lifeline: 693.391.2617 (TTY: 282.577.6321). Call anytime for help.  (www.suicidepreventionlifeline.org)  National [place] on Mental Illness (www.dante.org): 420.556.3601 or 871-608-7246.   Mental Health Association (www.mentalhealth.org): 256.980.7992 or 930-962-8417.  [place] Crisis Text Line: Text [place] to 756335  Suicide LifeLine Chat: suicideMedStartr.org/chat    Administrative Billing:   Time spent with patient includes counseling and coordination of care regarding above diagnoses and treatment plan.    Patient Status:  This is a continuous care patient and medications will be prescribed by the psychiatric provider until further indicated.    Signed:   BRITT [facility]   Psychiatry     Answers submitted by the patient for this visit:  Patient Health Questionnaire (Submitted on 7/27/2023)  If you checked off any problems, how difficult have these problems made it for you to do your work, take care of things at home, or get along with other people?: Somewhat difficult  PHQ9 TOTAL SCORE:  5

## 2023-08-04 RX ORDER — METHYLPHENIDATE HYDROCHLORIDE 27 MG/1
27 TABLET ORAL EVERY MORNING
Qty: 30 TABLET | Refills: 0 | Status: SHIPPED | OUTPATIENT
Start: 2023-08-07 | End: 2023-09-28

## 2023-08-04 RX ORDER — ESCITALOPRAM OXALATE 5 MG/1
5 TABLET ORAL DAILY
Qty: 30 TABLET | Refills: 3 | Status: SHIPPED | OUTPATIENT
Start: 2023-08-04 | End: 2023-09-28 | Stop reason: DRUGHIGH

## 2023-09-15 ENCOUNTER — TELEPHONE (OUTPATIENT)
Dept: PSYCHIATRY | Facility: CLINIC | Age: 28
End: 2023-09-15
Payer: COMMERCIAL

## 2023-09-15 NOTE — TELEPHONE ENCOUNTER
RN phoned patient at [phone]. Let patient know that her medication had been called in. She verbalized understanding    ARGELIA DAMIAN RN on 9/15/2023 at 2:52 PM

## 2023-09-15 NOTE — TELEPHONE ENCOUNTER
Reason for call:  Medication     If this is a refill request, has the caller requested the refill from the pharmacy already? Yes    Will the patient be using a [facility]? No    Name of the pharmacy and phone number for the current request:   [id number] - [place], [place] - [address] AT [facility]   [phone]     Name of the medication requested:   methylphenidate HCL ER, OSM, (CONCERTA) 27 MG CR tablet     Other request: pt calling back regarding medication refill request, follow-up appt scheduled, pt is out of this medication today    Phone number to reach patient:  Home number on file [phone] (home)    Best Time:  ANY    Can we leave a detailed message on this number?  YES    Travel screening: Not Applicable

## 2023-09-15 NOTE — TELEPHONE ENCOUNTER
Reason for call:  Return call     Patient called regarding (reason for call): Regarding Medication question     Phone number to reach patient:  Home number on file [phone] (home)    Best Time:  anytime    Can we leave a detailed message on this number?  YES    Travel screening: Not Applicable

## 2023-09-15 NOTE — TELEPHONE ENCOUNTER
Reason for call:  Medication   If this is a refill request, has the caller requested the refill from the pharmacy already? N/A  Will the patient be using a [facility]? No  Name of the pharmacy and phone number for the current request: [facility] in [place]    Name of the medication requested: methylphenidate    Other request: Pt states she is out of this med    Phone number to reach patient:  Home number on file [phone] (home)    Best Time:  any    Can we leave a detailed message on this number?  YES    Travel screening: Not Applicable.c

## 2023-09-28 ENCOUNTER — VIRTUAL VISIT (OUTPATIENT)
Dept: PSYCHIATRY | Facility: CLINIC | Age: 28
End: 2023-09-28
Payer: COMMERCIAL

## 2023-09-28 DIAGNOSIS — F90.0 ATTENTION DEFICIT HYPERACTIVITY DISORDER (ADHD), PREDOMINANTLY INATTENTIVE TYPE: ICD-10-CM

## 2023-09-28 DIAGNOSIS — F31.61 BIPOLAR DISORDER, CURRENT EPISODE MIXED, MILD (H): Primary | ICD-10-CM

## 2023-09-28 DIAGNOSIS — F41.1 GAD (GENERALIZED ANXIETY DISORDER): ICD-10-CM

## 2023-09-28 PROCEDURE — 99214 OFFICE O/P EST MOD 30 MIN: CPT | Mod: VID | Performed by: NURSE PRACTITIONER

## 2023-09-28 RX ORDER — METHYLPHENIDATE HYDROCHLORIDE 27 MG/1
27 TABLET ORAL EVERY MORNING
Qty: 30 TABLET | Refills: 0 | Status: SHIPPED | OUTPATIENT
Start: 2023-09-28 | End: 2023-12-12

## 2023-09-28 RX ORDER — ESCITALOPRAM OXALATE 10 MG/1
10 TABLET ORAL DAILY
Qty: 30 TABLET | Refills: 3 | Status: SHIPPED | OUTPATIENT
Start: 2023-09-28 | End: 2023-12-12

## 2023-09-28 RX ORDER — LAMOTRIGINE 100 MG/1
100 TABLET ORAL 2 TIMES DAILY
Qty: 60 TABLET | Refills: 3 | Status: SHIPPED | OUTPATIENT
Start: 2023-09-28 | End: 2023-12-12

## 2023-09-28 RX ORDER — METHYLPHENIDATE HYDROCHLORIDE 27 MG/1
27 TABLET ORAL EVERY MORNING
Qty: 30 TABLET | Refills: 0 | Status: SHIPPED | OUTPATIENT
Start: 2023-10-26 | End: 2023-10-20

## 2023-09-28 ASSESSMENT — PAIN SCALES - GENERAL: PAINLEVEL: NO PAIN (0)

## 2023-09-28 NOTE — PATIENT INSTRUCTIONS
"Patient Education   The Panel Psychiatry Program  What to Expect  Here's what to expect in the Panel Psychiatry Program.   About the program  You'll be meeting with a psychiatric doctor to check your mental health. A psychiatric doctor helps you deal with troubling thoughts and feelings by giving you medicine. They'll make sure you know the plan for your care. You may see them for a long time. When you're feeling better, they may refer you back to seeing your family doctor.   If you have any questions, we'll be glad to talk to you.  About visits  Be open  At your visits, please talk openly about your problems. It may feel hard, but it's the best way for us to help you.  Cancelling visits  If you can't come to your visit, please call us right away at [phone]. If you don't cancel at least 24 hours (1 full day) before your visit, that's \"late cancellation.\"  Not showing up for your visits  Being very late is the same as not showing up. You'll be a \"no show\" if:  You're more than 15 minutes late for a 30-minute (half hour) visit.  You're more than 30 minutes late for a 60-minute (full hour) visit.  If you cancel late or don't show up 2 times within 6 months, we may end your care.  Getting help between visits  If you need help between visits, you can call us Monday to Friday from 8 a.m. to 4:30 p.m. at [phone].  Emergency care  Call 911 or go to the nearest emergency department if your life or someone else's life is in danger.  Call 988 anytime to reach the national Suicide and Crisis hotline.  Medicine refills  To refill your medicine, call your pharmacy. You can also call [facility]'s Behavioral Access at [phone], Monday to Friday, 8 a.m. to 4:30 p.m. It can take 1 to 3 business days to get a refill.   Forms, letters, and tests  You may have papers to fill out, like FMLA, short-term disability, and workability. We can help you with these forms at your visits, but you must have an " appointment. You may need more than 1 visit for this, to be in an intensive therapy program, or both.  Before we can give you medicine for ADHD, we may refer you to get tested for it or confirm it another way.  We may not be able to give you an emotional support animal letter.  We don't do mental health checks ordered by the court.   We don't do mental health testing, but we can refer you to get tested.   Thank you for choosing us for your care.  For informational purposes only. Not to replace the advice of your health care provider. Copyright   2022 [facility] [employer]. All rights reserved. [employer] 759089 - 12/22.         Treatment Plan:      1.  Lamotrigine 100 mg 2 times daily  2.  Concerta 27 mg daily  3.  Decrease escitalopram to 10 mg daily    Continue all other medications as reviewed per electronic medical record today.   Safety plan reviewed. To the Emergency Department as needed or call after hours crisis line at [phone] or [phone]. [place] Crisis Text Line. Text [place] to 875276 or Suicide LifeLine Chat: suicidepreventionlifeline.org/chat/  To schedule individual or family therapy, call [facility] at [phone]  Schedule an appointment with me in 2 months or sooner as needed. Call [facility] at [phone] to schedule.  Follow up with primary care provider as planned or for acute medical concerns.  Call the psychiatric nurse line with medication questions or concerns at [phone]  MyChart may be used to communicate with your provider, but this is not intended to be used for emergencies.    Crisis Resources:    National Suicide Prevention Lifeline: 424.105.1385 (TTY: 588.228.8543). Call anytime for help.  (www.suicidepreventionlifeline.org)  National [place] on Mental Illness (www.dante.org): 580.876.4867 or 223-830-9856.   Mental Health Association (www.mentalhealth.org): 994.243.6201 or 080-405-2724.  [place] Crisis Text [place]: Text [place] to  440307  Suicide LifeLine Chat: suicidepreventionlifeline.org/chat

## 2023-09-28 NOTE — PROGRESS NOTES
"Virtual Visit Details    Type of service:  Video Visit   Video Start Time: 8:42 AM  Video End Time: 9:12 AM    Originating Location (pt. Location): Home    Distant Location (provider location):  On-site  Platform used for Video Visit: [facility]           Outpatient Psychiatric Progress Note    Name: Marcela Harris   : 1995                    Primary Care Provider: Sol Talbot CNP   Therapist: None    PHQ-9 scores:      2021    12:00 PM 2021     3:38 PM 2023    10:47 AM   PHQ-9 SCORE   PHQ-9 Total Score MyChart   5 (Mild depression)   PHQ-9 Total Score 10 3 5       JAISON-7 scores:      2021    12:00 PM 2021     3:38 PM   JAISON-7 SCORE   Total Score 11 1       Patient Identification:    Patient is a 28 year old year old, single  White Not  or  female  who presents for return visit with me.  Patient is currently unemployed. Patient attended the session alone. Patient prefers to be called: \" Marcela\".    Current medications include: escitalopram (LEXAPRO) 10 MG tablet, Take 1 tablet (10 mg) by mouth daily  escitalopram (LEXAPRO) 5 MG tablet, Take 1 tablet (5 mg) by mouth daily With 10 mg for a total of 15 mg daily  etonogestrel (NEXPLANON) 68 MG IMPL, 1 each by Subdermal route once  lamoTRIgine (LAMICTAL) 100 MG tablet, Take 1 tablet (100 mg) by mouth 2 times daily  methylphenidate HCL ER, OSM, (CONCERTA) 27 MG CR tablet, Take 1 tablet (27 mg) by mouth every morning  methylphenidate HCL ER, OSM, (CONCERTA) 27 MG CR tablet, Take 1 tablet (27 mg) by mouth every morning Fill on or after     No current facility-administered medications on file prior to visit.       The [place] Prescription Monitoring Program has been reviewed and there are no concerns about diversionary activity for controlled substances at this time.      I was able to review most recent Primary Care Provider, specialty provider, and therapy visit notes that I have access to.     Today, patient reports She " went to bed late last night .  Work has been a lot.  Grandfather was diagnosed with cancer.   She is in the transition of moving to [place].   She quit her job.  She will be living alone for a while.    She is feeling a little overwhelming.  Her depression and anxiety symptoms have been better .   She has a tendency to worry about her upcoming move.  Concerta is working well.  Her biggest concern about her Concerta - when she missed two days of her medication, she felt unfocused and noticed an increase in her appetite.                                                                                                                      has a past medical history of Attention deficit hyperactivity disorder (ADHD), predominantly inattentive type, Contraceptive management, Gastroesophageal reflux disease without esophagitis, and Seizure disorder (H) (2000).    Social history updates:    Marcela is preparing to move to live closer to her grandparents so that she can care for them.    Substance use updates:    No alcohol use reported  Tobacco use: No    Vital Signs:   There were no vitals taken for this visit.    Labs:    Most recent laboratory results reviewed and no new labs.     Mental Status Examination:  Appearance: awake, alert  Attitude: cooperative  Eye Contact:   Unable to assess  Gait and Station: No dizziness or falls  Psychomotor Behavior:   Unable to assess  Oriented to:  time, person, and place  Attention Span and Concentration:  Normal  Speech:   vtspeech: clear, coherent and Speaks: English  Mood:  anxious, depressed, and better  Affect:  mood congruent  Associations:  no loose associations  Thought Process:  goal oriented  Thought Content:  no evidence of suicidal ideation or homicidal ideation, no auditory hallucinations present, and no visual hallucinations present  Recent and Remote Memory:  intact Not formally assessed. No amnesia.  Fund of Knowledge: appropriate  Insight:  good  Judgment:  good  Impulse Control:  good    Suicide Risk Assessment:  Today Marcela Harris reports no thoughts to harm themself or others. In addition, there are notable risk factors for self-harm, including single status, anxiety, and mood change. However, risk is mitigated by commitment to family, history of seeking help when needed, future oriented, denies suicidal intent or plan, and denies homicidal ideation, intent, or plan. Therefore, based on all available evidence including the factors cited above, Marcela Harris does not appear to be at imminent risk for self-harm, does not meet criteria for a 72-hr hold, and therefore remains appropriate for ongoing outpatient level of care.  A thorough assessment of risk factors related to suicide and self-harm have been reviewed and are noted above. The patient convincingly denies suicidality on several occasions. Local community safety resources printed and reviewed for patient to use if needed. There was no deceit detected, and the patient presented in a manner that was believable.     DSM5 Diagnosis:  Attention-Deficit/Hyperactivity Disorder  314.00 (F90.0) Predominantly inattentive presentation  296.89 Bipolar II Disorder With mixed features and mild  300.02 (F41.1) Generalized Anxiety Disorder    Medical comorbidities include:   Patient Active Problem List    Diagnosis Date Noted    Attention deficit hyperactivity disorder (ADHD), predominantly inattentive type      Priority: Medium    Gastroesophageal reflux disease without esophagitis      Priority: Medium    Seizure disorder (H) 2000     Priority: Medium     TBI with brain tumor           Assessment:    Marcela Harris is planning to move to live closer to her grandparents so she can care for them.  She never started the 15 mg dose of Lexapro and is still feeling better without it.  This point she will continue with 10 mg daily.  Anxiety continues but is related to her upcoming move.  Concerta is effective in helping her stay on  task to follow through with staying motivated with goals formed.  Lamotrigine continues for mood regulation at 100 mg twice daily..    Medication side effects and alternatives were reviewed. Health promotion activities recommended and reviewed today. All questions addressed. Education and counseling completed regarding risks and benefits of medications and psychotherapy options.    Treatment Plan:      1.  Lamotrigine 100 mg 2 times daily  2.  Concerta 27 mg daily  3.  Decrease escitalopram to 10 mg daily    Continue all other medications as reviewed per electronic medical record today.   Safety plan reviewed. To the Emergency Department as needed or call after hours crisis line at [phone] or [phone]. [place] Crisis Text [place]. Text [place] to 561987 or Suicide LifeLine Chat: [employer]inity[employer].org/chat/  To schedule individual or family therapy, call [facility] at [phone]  Schedule an appointment with me in 2 months or sooner as needed. Call [facility] at [phone] to schedule.  Follow up with primary care provider as planned or for acute medical concerns.  Call the psychiatric nurse line with medication questions or concerns at [phone]  MyChart may be used to communicate with your provider, but this is not intended to be used for emergencies.    Crisis Resources:    National Suicide Prevention Lifeline: 466.393.4924 (TTY: 182.754.2586). Call anytime for help.  (www.suicidepreventionlifeline.org)  National [place] on Mental Illness (www.dante.org): 185.532.4753 or 726-151-2245.   Mental Health Association (www.mentalhealth.org): 842.281.1496 or 587-897-8468.  [place] Crisis Text [place]: Text [place] to 724269  Suicide LifeLine Chat: [employer].org/chat    Administrative Billing:   Time spent with patient includes counseling and coordination of care regarding above diagnoses and treatment plan.    Patient Status:  This is a continuous care patient  and medications will be prescribed by the psychiatric provider until further indicated.    Signed:   BRITT RebolledoP-BC   Psychiatry

## 2023-09-28 NOTE — NURSING NOTE
Is the patient currently in the state of [place]? YES    Visit mode:VIDEO    If the visit is dropped, the patient can be reconnected by: VIDEO VISIT: Text to cell phone:   Telephone Information:   Mobile [phone]       Will anyone else be joining the visit? NO  (If patient encounters technical issues they should call [phone])    How would you like to obtain your AVS? MyChart    Are changes needed to the allergy or medication list? No    Reason for visit: RECHECK    Meredith HOLMAN

## 2023-10-20 DIAGNOSIS — F90.0 ATTENTION DEFICIT HYPERACTIVITY DISORDER (ADHD), PREDOMINANTLY INATTENTIVE TYPE: ICD-10-CM

## 2023-10-20 RX ORDER — METHYLPHENIDATE HYDROCHLORIDE 27 MG/1
27 TABLET ORAL EVERY MORNING
Qty: 30 TABLET | Refills: 0 | Status: SHIPPED | OUTPATIENT
Start: 2023-10-26 | End: 2023-12-02

## 2023-10-20 NOTE — TELEPHONE ENCOUNTER
Patient wanted to have her meds transferred to a different pharmacy. She has moved.   Patient called and was told her lexapro and lamotrigine she could have transferred by calling the new pharmacy.   She said they won't transfer the Concerta 27 mg. She said she is out but according to [place]  last sold on 9/29/23.   Will la up next Concerta prescription that is to start 10/26/23.     Sent patient a My chart message explaining that she wouldn't be able to  until 10/26/23    Tori Argueta RN on 10/20/2023 at 1:45 PM

## 2023-10-20 NOTE — TELEPHONE ENCOUNTER
Reason for call:  Other   Patient called regarding (reason for call): prescription  Additional comments: Patient requests that all of her medications be sent to a different pharmacy. She wants all of her medication sent to the [facility] in [place].   [address] [place] [address]  [phone]    Phone number to reach patient:  Home number on file [phone] (home)    Best Time:  ASAP    Can we leave a detailed message on this number?  YES    Travel screening: Not Applicable

## 2023-12-02 ENCOUNTER — MYC REFILL (OUTPATIENT)
Dept: INTERNAL MEDICINE | Facility: CLINIC | Age: 28
End: 2023-12-02
Payer: COMMERCIAL

## 2023-12-02 DIAGNOSIS — F90.0 ATTENTION DEFICIT HYPERACTIVITY DISORDER (ADHD), PREDOMINANTLY INATTENTIVE TYPE: ICD-10-CM

## 2023-12-04 RX ORDER — METHYLPHENIDATE HYDROCHLORIDE 27 MG/1
27 TABLET ORAL EVERY MORNING
Qty: 30 TABLET | Refills: 0 | Status: SHIPPED | OUTPATIENT
Start: 2023-12-04 | End: 2024-01-30 | Stop reason: ALTCHOICE

## 2023-12-04 NOTE — TELEPHONE ENCOUNTER
Date of Last Office Visit: 9/28/23  Date of Next Office Visit: 12/12/23  No shows since last visit: none  Cancellations since last visit: none    Medication requested: methylphenidate HCL ER, OSM, (CONCERTA) 27 MG CR tablet Date last ordered: 10/26/23 Qty: 30 Refills: 0     [place] of [place] ?: methylphenidate HCL ER, OSM, (CONCERTA) 27 MG CR tablet   Medication last sold date: 11/1/23 Qty filled: 30  Other controlled substance on [place] ?: yes  If yes, is this a new medication?: no  If yes, name of medication: na and date filled: na    Lapse in medication adherence greater than 5 days?: no  If yes, call patient and gather details: na  Medication refill request verified as identical to current order?: yes  Result of Last DAM, VPA, Li+ Level, CBC, or Carbamazepine Level (at or since last visit): N/A    Last visit treatment plan:     Treatment Plan:        1.  Lamotrigine 100 mg 2 times daily  2.  Concerta 27 mg daily  3.  Decrease escitalopram to 10 mg daily     Continue all other medications as reviewed per electronic medical record today.   Safety plan reviewed. To the Emergency Department as needed or call after hours crisis line at [phone] or [phone]. [place] Crisis Text [place]. Text [place] to 578521 or Suicide LifeLine Chat: suicidepreventionlifeline.org/chat/  To schedule individual or family therapy, call [facility] at [phone]  Schedule an appointment with me in 2 months or sooner as needed. Call [facility] at [phone] to schedule.  Follow up with primary care provider as planned or for acute medical concerns.  Call the psychiatric nurse line with medication questions or concerns at [phone]  MyChart may be used to communicate with your provider, but this is not intended to be used for emergencies.       []Medication refilled per  Medication Refill in Ambulatory Care  policy.  [x]Medication unable to be refilled by RN due to criteria not met as indicated below:     []Eligibility - not seen in the last year   []Supervision - no future appointment   []Compliance - no shows, cancellations or lapse in therapy   []Verification - order discrepancy   [x]Controlled medication   [x]Medication not included in policy   []90-day supply request   []Other

## 2023-12-12 ENCOUNTER — VIRTUAL VISIT (OUTPATIENT)
Dept: PSYCHIATRY | Facility: CLINIC | Age: 28
End: 2023-12-12
Payer: COMMERCIAL

## 2023-12-12 DIAGNOSIS — F31.61 BIPOLAR DISORDER, CURRENT EPISODE MIXED, MILD (H): Primary | ICD-10-CM

## 2023-12-12 DIAGNOSIS — F41.1 GAD (GENERALIZED ANXIETY DISORDER): ICD-10-CM

## 2023-12-12 DIAGNOSIS — F90.0 ATTENTION DEFICIT HYPERACTIVITY DISORDER (ADHD), PREDOMINANTLY INATTENTIVE TYPE: ICD-10-CM

## 2023-12-12 PROCEDURE — 99214 OFFICE O/P EST MOD 30 MIN: CPT | Mod: VID | Performed by: NURSE PRACTITIONER

## 2023-12-12 RX ORDER — METHYLPHENIDATE HYDROCHLORIDE 27 MG/1
27 TABLET ORAL EVERY MORNING
Qty: 30 TABLET | Refills: 0 | Status: SHIPPED | OUTPATIENT
Start: 2024-01-03 | End: 2024-01-30 | Stop reason: ALTCHOICE

## 2023-12-12 RX ORDER — ESCITALOPRAM OXALATE 10 MG/1
10 TABLET ORAL DAILY
Qty: 30 TABLET | Refills: 3 | Status: SHIPPED | OUTPATIENT
Start: 2023-12-12 | End: 2024-03-01

## 2023-12-12 RX ORDER — LAMOTRIGINE 100 MG/1
100 TABLET ORAL 2 TIMES DAILY
Qty: 60 TABLET | Refills: 3 | Status: SHIPPED | OUTPATIENT
Start: 2023-12-12 | End: 2024-03-01

## 2023-12-12 ASSESSMENT — PAIN SCALES - GENERAL: PAINLEVEL: NO PAIN (0)

## 2023-12-12 NOTE — PATIENT INSTRUCTIONS
"Patient Education   The [facility] Program  What to Expect  Here's what to expect in the Panel Psychiatry Program.   About the program  You'll be meeting with a psychiatric doctor to check your mental health. A psychiatric doctor helps you deal with troubling thoughts and feelings by giving you medicine. They'll make sure you know the plan for your care. You may see them for a long time. When you're feeling better, they may refer you back to seeing your family doctor.   If you have any questions, we'll be glad to talk to you.  About visits  Be open  At your visits, please talk openly about your problems. It may feel hard, but it's the best way for us to help you.  Cancelling visits  If you can't come to your visit, please call us right away at [phone]. If you don't cancel at least 24 hours (1 full day) before your visit, that's \"late cancellation.\"  Not showing up for your visits  Being very late is the same as not showing up. You'll be a \"no show\" if:  You're more than 15 minutes late for a 30-minute (half hour) visit.  You're more than 30 minutes late for a 60-minute (full hour) visit.  If you cancel late or don't show up 2 times within 6 months, we may end your care.  Getting help between visits  If you need help between visits, you can call us Monday to Friday from 8 a.m. to 4:30 p.m. at [phone].  Emergency care  Call 911 or go to the nearest emergency department if your life or someone else's life is in danger.  Call 988 anytime to reach the national Suicide and Crisis hotline.  Medicine refills  To refill your medicine, call your pharmacy. You can also call [facility]'s [facility] at [phone], Monday to Friday, 8 a.m. to 4:30 p.m. It can take 1 to 3 business days to get a refill.   Forms, letters, and tests  You may have papers to fill out, like FMLA, short-term disability, and workability. We can help you with these forms at your visits, but you must have an " appointment. You may need more than 1 visit for this, to be in an intensive therapy program, or both.  Before we can give you medicine for ADHD, we may refer you to get tested for it or confirm it another way.  We may not be able to give you an emotional support animal letter.  We don't do mental health checks ordered by the court.   We don't do mental health testing, but we can refer you to get tested.   Thank you for choosing us for your care.  For informational purposes only. Not to replace the advice of your health care provider. Copyright   2022 [facility]. All rights reserved. [place] [id number] - 12/22.       Treatment Plan:      1.  Continue Concerta 27 mg daily-next refill written for January 3  2.  Continue lamotrigine 100 mg 2 times daily  3.  Continue Lexapro 10 mg daily    Continue all other medications as reviewed per electronic medical record today.   Safety plan reviewed. To the Emergency Department as needed or call after hours crisis line at [phone] or [phone]. [place] Crisis Text Line. Text [place] to 231884 or Suicide LifeLine Chat: suicidepreventionlifeline.org/chat/  You may also call 138  To schedule individual or family therapy, call [facility] at [phone]  Schedule an appointment with me in 6 weeks or sooner as needed. Call [facility] at [phone] to schedule.  Follow up with primary care provider as planned or for acute medical concerns.  Call the psychiatric nurse line with medication questions or concerns at [phone]  MyChart may be used to communicate with your provider, but this is not intended to be used for emergencies.    Crisis Resources:    National Suicide Prevention Lifeline: 451.775.1534 (TTY: 164.787.5652). Call anytime for help.  (www.suicidepreventionlifeline.org)  National [place] on Mental Illness (www.dante.org): 609.614.4161 or 119-989-5879.   Mental Health Association (www.mentalhealth.org): 918.810.2203 or  240-827-4035.  [place] Crisis Text Line: Text [place] to 475640  Suicide LifeLine Chat: suicidepreventionlifeline.org/chat

## 2023-12-12 NOTE — PROGRESS NOTES
"Virtual Visit Details    Type of service:  Video Visit   Video Start Time: 10:38 AM  Video End Time:11:05 AM    Originating Location (pt. Location): Home    Distant Location (provider location):  Off-site  Platform used for Video Visit: [facility]           Outpatient Psychiatric Progress Note    Name: Marcela Harris   : 1995                    Primary Care Provider: Sol Talbot CNP   Therapist: None    PHQ-9 scores:      2021    12:00 PM 2021     3:38 PM 2023    10:47 AM   PHQ-9 SCORE   PHQ-9 Total Score MyChart   5 (Mild depression)   PHQ-9 Total Score 10 3 5       JAISON-7 scores:      2021    12:00 PM 2021     3:38 PM   JAISON-7 SCORE   Total Score 11 1       Patient Identification:    Patient is a 28 year old year old, single  White Not  or  female  who presents for return visit with me.  Patient is currently employed full time. Patient attended the session alone. Patient prefers to be called: \" Marcela\".    Current medications include: escitalopram (LEXAPRO) 10 MG tablet, Take 1 tablet (10 mg) by mouth daily  etonogestrel (NEXPLANON) 68 MG IMPL, 1 each by Subdermal route once  lamoTRIgine (LAMICTAL) 100 MG tablet, Take 1 tablet (100 mg) by mouth 2 times daily  methylphenidate HCL ER, OSM, (CONCERTA) 27 MG CR tablet, Take 1 tablet (27 mg) by mouth every morning  methylphenidate HCL ER, OSM, (CONCERTA) 27 MG CR tablet, Take 1 tablet (27 mg) by mouth every morning    No current facility-administered medications on file prior to visit.       The [place] Prescription Monitoring Program has been reviewed and there are no concerns about diversionary activity for controlled substances at this time.      I was able to review most recent Primary Care Provider, specialty provider, and therapy visit notes that I have access to.     Today, patient reports she is now living in her own place in [place] and is working at [employer]  Her grandfather is doing better.  Mood has been good.  " Hopelessness has gone away.  She has confidence to have on her own .  The last two nights she has struggled to get good sleep.  This was due to schedule changes.  She last had seizures years ago.  She wonders if she has seizures at 10 she is sleeping.     has a past medical history of Attention deficit hyperactivity disorder (ADHD), predominantly inattentive type, Contraceptive management, Gastroesophageal reflux disease without esophagitis, and Seizure disorder (H) (2000).    Social history updates:    Marcela recently moved to her own place.  She works at [employer].    Substance use updates:    No alcohol use reported  Tobacco use: No    Vital Signs:   There were no vitals taken for this visit.    Labs:    Most recent laboratory results reviewed and no new labs.     Mental Status Examination:  Appearance: awake, alert and casual dress  Attitude: cooperative  Eye Contact:  adequate  Gait and Station: No dizziness or falls  Psychomotor Behavior:  intact station, gait and muscle tone  Oriented to:  time, person, and place  Attention Span and Concentration:  Normal  Speech:   vtspeech: clear, coherent and Speaks: English  Mood:  anxious, depressed, and better  Affect:  mood congruent  Associations:  no loose associations  Thought Process:  goal oriented  Thought Content:  no evidence of suicidal ideation or homicidal ideation, no auditory hallucinations present, and no visual hallucinations present  Recent and Remote Memory:  intact Not formally assessed. No amnesia.  Fund of Knowledge: appropriate  Insight:  good  Judgment: good  Impulse Control:  good    Suicide Risk Assessment:  Today Marcela Harris reports no thoughts to harm themself or others. In addition, there are notable risk factors for self-harm, including single status, anxiety, and mood change. However, risk is mitigated by commitment to family, history of seeking help when needed, future oriented, denies suicidal intent or plan, and denies homicidal  ideation, intent, or plan. Therefore, based on all available evidence including the factors cited above, Marcela Harris does not appear to be at imminent risk for self-harm, does not meet criteria for a 72-hr hold, and therefore remains appropriate for ongoing outpatient level of care.  A thorough assessment of risk factors related to suicide and self-harm have been reviewed and are noted above. The patient convincingly denies suicidality on several occasions. Local community safety resources printed and reviewed for patient to use if needed. There was no deceit detected, and the patient presented in a manner that was believable.     DSM5 Diagnosis:  Attention-Deficit/Hyperactivity Disorder  314.00 (F90.0) Predominantly inattentive presentation  296.89 Bipolar II Disorder With mixed features and mild  300.02 (F41.1) Generalized Anxiety Disorder    Medical comorbidities include:   Patient Active Problem List    Diagnosis Date Noted    Attention deficit hyperactivity disorder (ADHD), predominantly inattentive type      Priority: Medium    Gastroesophageal reflux disease without esophagitis      Priority: Medium    Seizure disorder (H) 2000     Priority: Medium     TBI with brain tumor         Assessment:    Marcela Harris is feeling more stable now that she is living on her own.  She denies any feelings of hopelessness at this time.  Mood is stable with the Lamictal as ordered.  Concerta is helping her stay organized and focused at her new job at [employer].  She denies any suicide thoughts.  Lexapro continues as her depression symptoms have lessened considerably.    Medication side effects and alternatives were reviewed. Health promotion activities recommended and reviewed today. All questions addressed. Education and counseling completed regarding risks and benefits of medications and psychotherapy options.    Treatment Plan:      1.  Continue Concerta 27 mg daily-next refill written for January 3  2.  Continue  lamotrigine 100 mg 2 times daily  3.  Continue Lexapro 10 mg daily    Continue all other medications as reviewed per electronic medical record today.   Safety plan reviewed. To the Emergency Department as needed or call after hours crisis line at [phone] or [phone]. [place] Crisis Text Line. Text [place] to 889207 or Suicide LifeLine Chat: suicidePerkHub.org/chat/  You may also call 988  To schedule individual or family therapy, call [facility] at [phone]  Schedule an appointment with me in 6 weeks or sooner as needed. Call [facility] at [phone] to schedule.  Follow up with primary care provider as planned or for acute medical concerns.  Call the psychiatric nurse line with medication questions or concerns at [phone]  MyChart may be used to communicate with your provider, but this is not intended to be used for emergencies.    Crisis Resources:    National Suicide Prevention Lifeline: 323.931.7818 (TTY: 447.625.4597). Call anytime for help.  (www.suicidepreventionlifeline.org)  National [place] on Mental Illness (www.dante.org): 332.718.9385 or 500-456-9829.   Mental Health Association (www.mentalhealth.org): 830.870.6269 or 733-651-6164.  [place] Crisis Text Line: Text [place] to 363561  Suicide LifeLine Chat: suicidePerkHub.org/chat    Administrative Billing:   Time spent with patient includes counseling and coordination of care regarding above diagnoses and treatment plan.    Patient Status:  This is a continuous care patient and medications will be prescribed by the psychiatric provider until further indicated.    Signed:   Gely Salas [facility]-BC   Psychiatry

## 2023-12-12 NOTE — NURSING NOTE
Is the patient currently in the state of [place]? YES    Visit mode:VIDEO    If the visit is dropped, the patient can be reconnected by: VIDEO VISIT: Text to cell phone:   Telephone Information:   Mobile [phone]       Will anyone else be joining the visit? No  (If patient encounters technical issues they should call [phone])    How would you like to obtain your AVS? MyChart    Are changes needed to the allergy or medication list? No    Rooming Documentation: Assigned questionnaire(s) completed .    Reason for visit: RECHECK     RIVER Gonzalez

## 2024-01-01 ENCOUNTER — MYC REFILL (OUTPATIENT)
Dept: INTERNAL MEDICINE | Facility: CLINIC | Age: 29
End: 2024-01-01
Payer: COMMERCIAL

## 2024-01-01 DIAGNOSIS — F90.0 ATTENTION DEFICIT HYPERACTIVITY DISORDER (ADHD), PREDOMINANTLY INATTENTIVE TYPE: ICD-10-CM

## 2024-01-02 RX ORDER — METHYLPHENIDATE HYDROCHLORIDE 27 MG/1
27 TABLET ORAL EVERY MORNING
Qty: 30 TABLET | Refills: 0 | OUTPATIENT
Start: 2024-01-02

## 2024-01-02 NOTE — TELEPHONE ENCOUNTER
Reviewed patient's [employer] message for methylphenidate HCL ER, OSM, (CONCERTA) 27 MG CR tablet.     Concerta was ordered to start on 1/3/24. [employer] message sent to patient.     Concerta refill will be denied.

## 2024-01-30 ENCOUNTER — MYC REFILL (OUTPATIENT)
Dept: PSYCHIATRY | Facility: CLINIC | Age: 29
End: 2024-01-30
Payer: COMMERCIAL

## 2024-01-30 ENCOUNTER — MYC MEDICAL ADVICE (OUTPATIENT)
Dept: PSYCHIATRY | Facility: CLINIC | Age: 29
End: 2024-01-30
Payer: COMMERCIAL

## 2024-01-30 ENCOUNTER — VIRTUAL VISIT (OUTPATIENT)
Dept: PSYCHIATRY | Facility: CLINIC | Age: 29
End: 2024-01-30
Payer: COMMERCIAL

## 2024-01-30 DIAGNOSIS — F31.61 BIPOLAR DISORDER, CURRENT EPISODE MIXED, MILD (H): ICD-10-CM

## 2024-01-30 DIAGNOSIS — F90.0 ATTENTION DEFICIT HYPERACTIVITY DISORDER (ADHD), PREDOMINANTLY INATTENTIVE TYPE: ICD-10-CM

## 2024-01-30 DIAGNOSIS — F90.0 ATTENTION DEFICIT HYPERACTIVITY DISORDER (ADHD), PREDOMINANTLY INATTENTIVE TYPE: Primary | ICD-10-CM

## 2024-01-30 DIAGNOSIS — F41.1 GAD (GENERALIZED ANXIETY DISORDER): ICD-10-CM

## 2024-01-30 PROCEDURE — 99214 OFFICE O/P EST MOD 30 MIN: CPT | Mod: 95 | Performed by: NURSE PRACTITIONER

## 2024-01-30 RX ORDER — METHYLPHENIDATE HYDROCHLORIDE 27 MG/1
27 TABLET ORAL EVERY MORNING
Qty: 30 TABLET | Refills: 0 | Status: SHIPPED | OUTPATIENT
Start: 2024-01-30 | End: 2024-03-01

## 2024-01-30 RX ORDER — ESCITALOPRAM OXALATE 10 MG/1
10 TABLET ORAL DAILY
Qty: 30 TABLET | Refills: 3 | OUTPATIENT
Start: 2024-01-30

## 2024-01-30 RX ORDER — DEXMETHYLPHENIDATE HYDROCHLORIDE 25 MG/1
25 CAPSULE, EXTENDED RELEASE ORAL DAILY
Qty: 30 CAPSULE | Refills: 0 | OUTPATIENT
Start: 2024-01-30

## 2024-01-30 RX ORDER — LAMOTRIGINE 100 MG/1
100 TABLET ORAL 2 TIMES DAILY
Qty: 60 TABLET | Refills: 3 | OUTPATIENT
Start: 2024-01-30

## 2024-01-30 RX ORDER — DEXMETHYLPHENIDATE HYDROCHLORIDE 25 MG/1
25 CAPSULE, EXTENDED RELEASE ORAL DAILY
Qty: 30 CAPSULE | Refills: 0 | Status: SHIPPED | OUTPATIENT
Start: 2024-01-30 | End: 2024-01-30 | Stop reason: ALTCHOICE

## 2024-01-30 NOTE — TELEPHONE ENCOUNTER
Patient said she can't find the Focalin XR 25 mg at any of the pharmacy's near her. They are all backordered.     Wondering what she should do?      Last visit from today:  1.  Concerta discontinued  2.  Begin Focalin XR 25 mg daily  3.  Continue lamotrigine 100 mg 2 times daily  4.  Continue Lexapro 10 mg daily    Tori Argueta RN on 1/30/2024 at 11:44 AM

## 2024-01-30 NOTE — TELEPHONE ENCOUNTER
RN received he following notice from [facility]'s pharmacy regarding dexmethylphenidate (FOCALIN XR) 25 MG 24 hr capsule     Focalin 25 mg is not covered by insurance nor is it currently available - generic or brand name.  Please consider alternatives and send a new RX, if appropriate, Thanks.     RN forwarding to provider for review if changing RX or if we should proceed with the PA.      Nela Griffin RN on 1/30/2024 at 3:03 PM

## 2024-01-30 NOTE — NURSING NOTE
Is the patient currently in the state of [place]? YES    Visit mode:VIDEO    If the visit is dropped, the patient can be reconnected by: VIDEO VISIT: Text to cell phone:   Telephone Information:   Mobile [phone]       Will anyone else be joining the visit? No  (If patient encounters technical issues they should call [phone])    How would you like to obtain your AVS? MyChart    Are changes needed to the allergy or medication list? No    Rooming Documentation: Patient will complete qnrs in mychart.    Reason for visit: RIVER Lacey

## 2024-01-30 NOTE — PROGRESS NOTES
"Virtual Visit Details    Type of service:  Video Visit   Video Start Time: 8:18 AM  Video End Time:8:47 AM    Originating Location (pt. Location): Home    Distant Location (provider location):  Off-site  Platform used for Video Visit: [facility]             Outpatient Psychiatric Progress Note    Name: Marcela Harris   : 1995                    Primary Care Provider: Sol Talbot CNP   Therapist: None    PHQ-9 scores:      2021    12:00 PM 2021     3:38 PM 2023    10:47 AM   PHQ-9 SCORE   PHQ-9 Total Score MyChart   5 (Mild depression)   PHQ-9 Total Score 10 3 5       JAISON-7 scores:      2021    12:00 PM 2021     3:38 PM   JAISON-7 SCORE   Total Score 11 1       Patient Identification:    Patient is a 29 year old year old, single  White Not  or  female  who presents for return visit with me.  Patient is currently employed full time. Patient attended the session alone. Patient prefers to be called: \" Marcela\".    Current medications include: escitalopram (LEXAPRO) 10 MG tablet, Take 1 tablet (10 mg) by mouth daily  etonogestrel (NEXPLANON) 68 MG IMPL, 1 each by Subdermal route once  lamoTRIgine (LAMICTAL) 100 MG tablet, Take 1 tablet (100 mg) by mouth 2 times daily  methylphenidate HCL ER, OSM, (CONCERTA) 27 MG CR tablet, Take 1 tablet (27 mg) by mouth every morning Fill on or after January 3  methylphenidate HCL ER, OSM, (CONCERTA) 27 MG CR tablet, Take 1 tablet (27 mg) by mouth every morning    No current facility-administered medications on file prior to visit.       The [place] Prescription Monitoring Program has been reviewed and there are no concerns about diversionary activity for controlled substances at this time.      I was able to review most recent Primary Care Provider, specialty provider, and therapy visit notes that I have access to.     Today, patient reports she has influenza A.  She does not have insurance.  She has felt anxious and on edge.    Sometimes has " panic attacks.  If she is unable to get things done at work she gets anxious.  She has gained 20 pounds - has been eating [employer] food.  Anxious in social settings.  Today we explored medication options that may be more affordable to her until her insurance gets reinstated.     has a past medical history of Attention deficit hyperactivity disorder (ADHD), predominantly inattentive type, Contraceptive management, Gastroesophageal reflux disease without esophagitis, and Seizure disorder (H) (2000).    Social history updates:    Marcela continues to work at [employer] and is renting her own apartment.    Substance use updates:    No alcohol use reported  Tobacco use: No    Vital Signs:   There were no vitals taken for this visit.    Labs:    Most recent laboratory results reviewed and no new labs.     Mental Status Examination:  Appearance: awake, alert and casual dress  Attitude: cooperative  Eye Contact:  adequate  Gait and Station: No dizziness or falls  Psychomotor Behavior:  intact station, gait and muscle tone  Oriented to:  time, person, and place  Attention Span and Concentration:  Normal  Speech:   vtspeech: clear, coherent and Speaks: English  Mood:  anxious and labile  Affect:  mood congruent  Associations:  no loose associations  Thought Process:  goal oriented  Thought Content:  no evidence of suicidal ideation or homicidal ideation, no auditory hallucinations present, and no visual hallucinations present  Recent and Remote Memory:  intact Not formally assessed. No amnesia.  Fund of Knowledge: appropriate  Insight:  good  Judgment: good  Impulse Control:  good    Suicide Risk Assessment:  Today Marcela Harris reports no thoughts to harm themself or others. In addition, there are notable risk factors for self-harm, including single status, anxiety, and mood change. However, risk is mitigated by commitment to family, history of seeking help when needed, future oriented, denies suicidal intent or plan, and  denies homicidal ideation, intent, or plan. Therefore, based on all available evidence including the factors cited above, Marcela Harris does not appear to be at imminent risk for self-harm, does not meet criteria for a 72-hr hold, and therefore remains appropriate for ongoing outpatient level of care.  A thorough assessment of risk factors related to suicide and self-harm have been reviewed and are noted above. The patient convincingly denies suicidality on several occasions. Local community safety resources printed and reviewed for patient to use if needed. There was no deceit detected, and the patient presented in a manner that was believable.     DSM5 Diagnosis:  Attention-Deficit/Hyperactivity Disorder  314.00 (F90.0) Predominantly inattentive presentation  296.89 Bipolar II Disorder With mixed features and mild  300.02 (F41.1) Generalized Anxiety Disorder    Medical comorbidities include:   Patient Active Problem List    Diagnosis Date Noted    Attention deficit hyperactivity disorder (ADHD), predominantly inattentive type      Priority: Medium    Gastroesophageal reflux disease without esophagitis      Priority: Medium    Seizure disorder (H) 2000     Priority: Medium     TBI with brain tumor         Assessment:    Marcela Harris reported in feeling ill today with respiratory symptoms.  She is in the process of reinstating her health insurance and we talked about less expensive medications for her to take.  Concerta was discontinued and Focalin was begun for her attentional needs.  Lamotrigine is helpful in stabilizing her mood and she has reportedly been feeling less irritable.  Lexapro continues daily to manage breakthrough depression symptoms..    Medication side effects and alternatives were reviewed. Health promotion activities recommended and reviewed today. All questions addressed. Education and counseling completed regarding risks and benefits of medications and psychotherapy options.    Treatment  Plan:      1.  Concerta discontinued  2.  Begin Focalin XR 25 mg daily  3.  Continue lamotrigine 100 mg 2 times daily  4.  Continue Lexapro 10 mg daily    Continue all other medications as reviewed per electronic medical record today.   Safety plan reviewed. To the Emergency Department as needed or call after hours crisis line at [phone] or [phone]. [place] Crisis Text Line. Text [place] to 668152 or Suicide LifeLine Chat: suicidepreventionlifeline.org/chat/  To schedule individual or family therapy, call [facility] at [phone]  Schedule an appointment with me in February or sooner as needed. Call [facility] at [phone] to schedule.  Follow up with primary care provider as planned or for acute medical concerns.  Call the psychiatric nurse line with medication questions or concerns at [phone]  MyChart may be used to communicate with your provider, but this is not intended to be used for emergencies.        Crisis Resources   The EmPath is an adults only unit located at [facility] is a short term (generally less than 23 hour stay) designed for crisis intervention and stabilization. Pts have the opportunity to meet quickly with a behavioral health team for evaluation in a calm and peaceful therapuetic environment. To be evaluated for admission pts are triaged throught the [facility] ED.      The following hotlines are for both adults and children. The and are open 24 hours a day, 7 days a week unless noted otherwise.        Crisis Lines      Crisis Text Line  Text 650349  You will be connected with a trained live crisis counselor to provide support.        Gambling Hotline  2.659.107.7508 [hope]        línea de crisis española  [phone]        [facility] Kleo Helpline  [phone]        National Hope Line  7.069.982.7342 [hope]        National Suicide Prevention Lifeline  Free and confidential support  988 or 2.859.028.TALK  [8255]  http://suicidepreventionlifeline.org        The Isma Project (LGBTQ Youth Crisis Line)  0.396.319.0439  text START to 529-696        's Crisis Line  1.645.847.7396 (Press 1)  or text 022928    [facility] Mental Health Crisis Response  Within [place], call **CRISIS [**608409] to be connected to a mental health professional who can assist you.        [facility] Crisis  [phone]      [facility] Mobile Crisis  [phone]      [facility] Crisis  [phone]      [facility] Mobile Crisis  [phone] (adults)  [phone] (children)      [facility] Mobile Crisis  [phone] (adults)  [phone] (children)      [facility] Mobile Crisis  [phone]      [facility] Mobile Crisis  [phone]    Community Resources      Fast Tracker  Linking people to mental health and substance use disorder resources  [employer].org        [place] Mental Health Warmline  Peer to peer support  5 pm to 9 am 7 days/week  4.859.457.1388  https://mnwitw.org/shiva        National John Day on Mental Illness (POLA)  206.236.1376 or 1.888.POLA.HELPS  https://namimn.org/        [facility] Urgent Care for Adult Mental Health  [facility] residents only   [address]  [phone]        [facility]  Free mental health counseling  https://walkin.org/  612.870.0565 X2    Mental Health Apps      Calm Harm  https://calmharm.co.uk/      My3  https://my3app.org/      [place] Safety Plan  https://www.mysafetyplan.org/   Administrative Billing:   Time spent with patient includes counseling and coordination of care regarding above diagnoses and treatment plan.    Patient Status:  This is a continuous care patient and medications will be prescribed by the psychiatric provider until further indicated.    Signed:   BIJU Rebolledo-BC   Psychiatry

## 2024-01-30 NOTE — TELEPHONE ENCOUNTER
Received refill request for Focalin XR 25 mg. That was sent to pharmacy today.   Also refill request for lexapro 10 mg and lamotrigine 100 mg. Those were both refilled on 1/30/24 for #30 and 3 refills.     All scripts were refused. My chart message sent to patient.     Tori Argueta RN on 1/30/2024 at 10:52 AM

## 2024-01-30 NOTE — TELEPHONE ENCOUNTER
Reviewed patient's [employer] message:  The new medication you prescribed me there is a shortage they don t know when they will have it in stock I don t know what to do next if you can email the pharmacy or email me back that would be great thank you     RN phoned the patient and discussed that we are waiting directives from Gely Salas.    She would like a call back when Gely addresses her concern. [employer] isn't working.      Per RN colleague:    Patient said she can't find the Focalin XR 25 mg at any of the pharmacy's near her. They are all backordered.      Wondering what she should do?       Last visit from today:  1.  Concerta discontinued  2.  Begin Focalin XR 25 mg daily  3.  Continue lamotrigine 100 mg 2 times daily  4.  Continue Lexapro 10 mg daily            ARGELIA DAMIAN RN on 1/30/2024 at 12:11 PM

## 2024-01-30 NOTE — PATIENT INSTRUCTIONS
"Patient Education   The Panel Psychiatry Program  What to Expect  Here's what to expect in the Panel Psychiatry Program.   About the program  You'll be meeting with a psychiatric doctor to check your mental health. A psychiatric doctor helps you deal with troubling thoughts and feelings by giving you medicine. They'll make sure you know the plan for your care. You may see them for a long time. When you're feeling better, they may refer you back to seeing your family doctor.   If you have any questions, we'll be glad to talk to you.  About visits  Be open  At your visits, please talk openly about your problems. It may feel hard, but it's the best way for us to help you.  Cancelling visits  If you can't come to your visit, please call us right away at [phone]. If you don't cancel at least 24 hours (1 full day) before your visit, that's \"late cancellation.\"  Not showing up for your visits  Being very late is the same as not showing up. You'll be a \"no show\" if:  You're more than 15 minutes late for a 30-minute (half hour) visit.  You're more than 30 minutes late for a 60-minute (full hour) visit.  If you cancel late or don't show up 2 times within 6 months, we may end your care.  Getting help between visits  If you need help between visits, you can call us Monday to Friday from 8 a.m. to 4:30 p.m. at [phone].  Emergency care  Call 911 or go to the nearest emergency department if your life or someone else's life is in danger.  Call 988 anytime to reach the national Suicide and Crisis hotline.  Medicine refills  To refill your medicine, call your pharmacy. You can also call [facility]'s Behavioral Access at [phone], Monday to Friday, 8 a.m. to 4:30 p.m. It can take 1 to 3 business days to get a refill.   Forms, letters, and tests  You may have papers to fill out, like FMLA, short-term disability, and workability. We can help you with these forms at your visits, but you must have an " appointment. You may need more than 1 visit for this, to be in an intensive therapy program, or both.  Before we can give you medicine for ADHD, we may refer you to get tested for it or confirm it another way.  We may not be able to give you an emotional support animal letter.  We don't do mental health checks ordered by the court.   We don't do mental health testing, but we can refer you to get tested.   Thank you for choosing us for your care.  For informational purposes only. Not to replace the advice of your health care provider. Copyright   2022 [employer]. All rights reserved. Anne Fogarty 326289 - 12/22.       Treatment Plan:      1.  Concerta discontinued  2.  Begin Focalin XR 25 mg daily  3.  Continue lamotrigine 100 mg 2 times daily  4.  Continue Lexapro 10 mg daily        Continue all other medical directions per primary care provider.   Continue all other medications as reviewed per electronic medical record today.   Safety plan reviewed. To the Emergency Department as needed or call after hours crisis line at [phone] or [phone]. [place] Crisis Text Line: Text [place] to 887106  or  Suicide LifeLine Chat: suicidepreventionlifeline.org/chat/  To schedule individual or family therapy, call [facility] at [phone].   Schedule an appointment with me in 6 weeks or sooner as needed.  Call [facility] at [phone] to schedule.  Follow up with primary care provider as planned or for acute medical concerns.  Call the psychiatric nurse line with medication questions or concerns at [phone].  [employer] may be used to communicate with your provider, but this is not intended to be used for emergencies.        Crisis Resources   The EmPath is an adults only unit located at [facility] is a short term (generally less than 23 hour stay) designed for crisis intervention and stabilization. Pts have the opportunity to meet quickly with a behavioral  health team for evaluation in a calm and peaceful therapuetic environment. To be evaluated for admission pts are triaged throught the [facility] ED.      The following hotlines are for both adults and children. The and are open 24 hours a day, 7 days a week unless noted otherwise.        Crisis Lines      Crisis Text Line  Text 164904  You will be connected with a trained live crisis counselor to provide support.        Gambling Hotline  1.073.004.5216 [hope]        línea de crisis española  156.229.4559        [place] [employer] Helpline  675.341.8607        National Hope Line  2.581.088.8984 [hope]        National Suicide Prevention Lifeline  Free and confidential support  988 or 1.900.579.TALK [8255]  http://suicidepreventionlifeline.org        The Isma Project (LGBTQ Youth Crisis Line)  6.809.916.1009  text START to 845-532        [place] Crisis Line  8.218.978.9386 (Press 1)  or text 263402    [facility] Mental Health Crisis Response  Within [place], call **CRISIS [**013800] to be connected to a mental health professional who can assist you.        [facility] Crisis  [phone]      [facility] Mobile Crisis  [phone]      [facility] Crisis  [phone]      [facility] Mobile Crisis  [phone] (adults)  [phone] (children)      [facility] Mobile Crisis  [phone] (adults)  [phone] (children)      [facility] Mobile Crisis  [phone]      [facility] Mobile Crisis  [phone]    Community Resources      Fast Tracker  Linking people to mental health and substance use disorder resources  fasttrackermn.org        [place] Mental Health Warmline  Peer to peer support  5 pm to 9 am 7 days/week  0.622.805.2410  https://mnwitw.org/shiva        National [place] on Mental Illness (POLA)  274.059.7838 or 1.888.POLA.HELPS  https://namimn.org/        [facility] Urgent Care for Adult Mental Health  [facility] residents only    [address] [place] [address]  [phone]        [facility]  Free mental health counseling  https://walkin.org/  612.870.0565 X2    Mental Health Apps      Calm Harm  https://calmharm.co.uk/      My3  https://my3app.org/      [place] Safety Plan  https://www.mysafetyplan.org/

## 2024-03-01 ENCOUNTER — VIRTUAL VISIT (OUTPATIENT)
Dept: PSYCHIATRY | Facility: CLINIC | Age: 29
End: 2024-03-01
Payer: COMMERCIAL

## 2024-03-01 DIAGNOSIS — F31.61 BIPOLAR DISORDER, CURRENT EPISODE MIXED, MILD (H): Primary | ICD-10-CM

## 2024-03-01 DIAGNOSIS — F41.1 GAD (GENERALIZED ANXIETY DISORDER): ICD-10-CM

## 2024-03-01 DIAGNOSIS — F90.0 ATTENTION DEFICIT HYPERACTIVITY DISORDER (ADHD), PREDOMINANTLY INATTENTIVE TYPE: ICD-10-CM

## 2024-03-01 PROCEDURE — 99213 OFFICE O/P EST LOW 20 MIN: CPT | Mod: 95 | Performed by: NURSE PRACTITIONER

## 2024-03-01 RX ORDER — LAMOTRIGINE 100 MG/1
100 TABLET ORAL 2 TIMES DAILY
Qty: 60 TABLET | Refills: 3 | Status: SHIPPED | OUTPATIENT
Start: 2024-03-01 | End: 2024-08-20

## 2024-03-01 RX ORDER — METHYLPHENIDATE HYDROCHLORIDE 27 MG/1
27 TABLET ORAL EVERY MORNING
Qty: 30 TABLET | Refills: 0 | Status: SHIPPED | OUTPATIENT
Start: 2024-03-01 | End: 2024-05-02

## 2024-03-01 RX ORDER — ESCITALOPRAM OXALATE 10 MG/1
10 TABLET ORAL DAILY
Qty: 30 TABLET | Refills: 3 | Status: SHIPPED | OUTPATIENT
Start: 2024-03-01 | End: 2024-08-05

## 2024-03-01 NOTE — NURSING NOTE
Is the patient currently in the state of [place]? YES    Visit mode:VIDEO    If the visit is dropped, the patient can be reconnected by: VIDEO VISIT: Text to cell phone:   Telephone Information:   Mobile [phone]       Will anyone else be joining the visit? NO  (If patient encounters technical issues they should call [phone]    How would you like to obtain your AVS? MyChart    Are changes needed to the allergy or medication list? No    Reason for visit: RECHECK    Priscila HOLMAN

## 2024-03-01 NOTE — PROGRESS NOTES
"Virtual Visit Details    Type of service:  Video Visit   Video Start Time: 11:19 AM  Video End Time: 11:45 AM    Originating Location (pt. Location): Home    Distant Location (provider location):  Off-site  Platform used for Video Visit: [facility]             Outpatient Psychiatric Progress Note    Name: Marcela Harris   : 1995                    Primary Care Provider: Sol Talbot CNP   Therapist: None    PHQ-9 scores:      2021    12:00 PM 2021     3:38 PM 2023    10:47 AM   PHQ-9 SCORE   PHQ-9 Total Score MyChart   5 (Mild depression)   PHQ-9 Total Score 10 3 5       JAISON-7 scores:      2021    12:00 PM 2021     3:38 PM   JAISON-7 SCORE   Total Score 11 1       Patient Identification:    Patient is a 29 year old year old, single  White Not  or  female  who presents for return visit with me.  Patient is currently employed full time. Patient attended the session alone. Patient prefers to be called: \" Marcela\".    Current medications include: escitalopram (LEXAPRO) 10 MG tablet, Take 1 tablet (10 mg) by mouth daily  etonogestrel (NEXPLANON) 68 MG IMPL, 1 each by Subdermal route once  lamoTRIgine (LAMICTAL) 100 MG tablet, Take 1 tablet (100 mg) by mouth 2 times daily  methylphenidate HCL ER, OSM, (CONCERTA) 27 MG CR tablet, Take 1 tablet (27 mg) by mouth every morning    No current facility-administered medications on file prior to visit.       The [place] Prescription Monitoring Program has been reviewed and there are no concerns about diversionary activity for controlled substances at this time.      I was able to review most recent Primary Care Provider, specialty provider, and therapy visit notes that I have access to.     Today, patient reports that she is feeling well.  She had to start back to taking the Concerta as the Focalin was not in stock.  Financial stress.  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  Her grandfather is going through " chemotherapy.  His cancer has metastasized.  Her other grandfather is in a nursing home and gets dialysis.  She does not see much change in her mood since taking the lexapro.  After a long day at work she feels tense.    Stress relief tea helps her relax.    She feels sad and unmotivated.  She feels not as down.  When she did not take her medication she had stomachaches,headaches, and could not concentrate.      has a past medical history of Attention deficit hyperactivity disorder (ADHD), predominantly inattentive type, Contraceptive management, Gastroesophageal reflux disease without esophagitis, and Seizure disorder (H) (2000).    Social history updates:    No change in social history to report today    Substance use updates:    No alcohol use reported  Tobacco use: No    Vital Signs:   There were no vitals taken for this visit.    Labs:    Most recent laboratory results reviewed and no new labs.     Mental Status Examination:  Appearance: awake, alert and casual dress  Attitude: cooperative  Eye Contact:  adequate  Gait and Station: No dizziness or falls  Psychomotor Behavior:  intact station, gait and muscle tone  Oriented to:  time, person, and place  Attention Span and Concentration:  Normal  Speech:   vtspeech: clear, coherent and Speaks: English  Mood:  anxious and depressed  Affect:  mood congruent  Associations:  no loose associations  Thought Process:  goal oriented  Thought Content:  no evidence of suicidal ideation or homicidal ideation, no auditory hallucinations present, and no visual hallucinations present  Recent and Remote Memory:  intact Not formally assessed. No amnesia.  Fund of Knowledge: appropriate  Insight:  good  Judgment: good  Impulse Control:  good    Suicide Risk Assessment:  Today Marcela EDWINA Harris reports no thoughts to harm themself or others. In addition, there are notable risk factors for self-harm, including single status, anxiety, and mood change. However, risk is mitigated by  commitment to family, history of seeking help when needed, future oriented, denies suicidal intent or plan, and denies homicidal ideation, intent, or plan. Therefore, based on all available evidence including the factors cited above, Marcela Harris does not appear to be at imminent risk for self-harm, does not meet criteria for a 72-hr hold, and therefore remains appropriate for ongoing outpatient level of care.  A thorough assessment of risk factors related to suicide and self-harm have been reviewed and are noted above. The patient convincingly denies suicidality on several occasions. Local community safety resources printed and reviewed for patient to use if needed. There was no deceit detected, and the patient presented in a manner that was believable.     DSM5 Diagnosis:  Attention-Deficit/Hyperactivity Disorder  314.00 (F90.0) Predominantly inattentive presentation  296.89 Bipolar II Disorder With mixed features and mild  300.02 (F41.1) Generalized Anxiety Disorder    Medical comorbidities include:   Patient Active Problem List    Diagnosis Date Noted    Attention deficit hyperactivity disorder (ADHD), predominantly inattentive type      Priority: Medium    Gastroesophageal reflux disease without esophagitis      Priority: Medium    Seizure disorder (H) 2000     Priority: Medium     TBI with brain tumor         Assessment:    Marcela Harris was unable to start the Focalin due to no access to getting it.  She started back on the Concerta and it is working fine to help her stay organized and focused at work.  She has not seen much change in her anxiety symptoms but reports depression is less intense since starting the Lexapro.  She declined an increase in the dose today.  Lamotrigine continues twice daily to help with mood regulation.  Financial stress and failing health of her grandfathers contribute to her depression and anxiety symptoms..    Medication side effects and alternatives were reviewed. Health promotion  activities recommended and reviewed today. All questions addressed. Education and counseling completed regarding risks and benefits of medications and psychotherapy options.    Treatment Plan:      1.  Continue Concerta 27 mg daily  2.  Continue escitalopram 10 mg daily  3.  Continue lamotrigine 100 mg 2 times daily    Continue all other medications as reviewed per electronic medical record today.   Safety plan reviewed. To the Emergency Department as needed or call after hours crisis line at [phone] or [phone]. [place] Crisis Text Line. Text [place] to 519403 or Suicide LifeLine Chat: suicidepreventionlifeline.org/chat/  To schedule individual or family therapy, call [facility] at [phone]  Schedule an appointment with me in 2 months or sooner as needed. Call [facility] at [phone] to schedule.  Follow up with primary care provider as planned or for acute medical concerns.  Call the psychiatric nurse line with medication questions or concerns at [phone]  MyChart may be used to communicate with your provider, but this is not intended to be used for emergencies.        Crisis Resources   The EmPath is an adults only unit located at [facility] is a short term (generally less than 23 hour stay) designed for crisis intervention and stabilization. Pts have the opportunity to meet quickly with a behavioral health team for evaluation in a calm and peaceful therapuetic environment. To be evaluated for admission pts are triaged throught the [facility] ED.      The following hotlines are for both adults and children. The and are open 24 hours a day, 7 days a week unless noted otherwise.        Crisis Lines      Crisis Text Line  Text 587121  You will be connected with a trained live crisis counselor to provide support.        Gambling Hotline  6.715.560.2465 [hope]        línea de crisis española  653.494.4234        [facility]   [phone]        National Hope Line  8.262.256.1007 [hope]        National Suicide Prevention Lifeline  Free and confidential support  988 or 1.483.273.TALK [8255]  http://suicidepreventionlifeline.org        The Isma Project (LGBTQ Youth Crisis Line)  8.109.773.6704  text START to 460-328        [facility] Crisis Line  9.310.088.4260 (Press 1)  or text 390371    [facility] Mental Health Crisis Response  Within [place], call **CRISIS [**744316] to be connected to a mental health professional who can assist you.        [facility] Crisis  [phone]      [facility] Mobile Crisis  [phone]      [facility] Crisis  [phone]      [facility] Mobile Crisis  [phone] (adults)  [phone] (children)      [facility] Mobile Crisis  [phone] (adults)  [phone] (children)      [facility] Mobile Crisis  [phone]      [facility] Mobile Crisis  [phone]    Community Resources      Fast Tracker  Linking people to mental health and substance use disorder resources  fasttrackTenantrexn.org        [place] Mental Health Warmline  Peer to peer support  5 pm to 9 am 7 days/week  4.655.329.9280  https://mnwitw.org/mnwarevan        National [place] on Mental Illness (POLA)  665.849.8709 or 1.888.POLA.HELPS  https://namimn.org/        [facility]  [address]  [phone]        [facility]  Free mental health counseling  https://walkin.org/  612.870.0565 X2    Mental Health Apps      Calm Harm  https://calmharm.co.uk/      My3  https://my3app.org/      Devika Safety Plan  https://www.mysafetyplan.org/   Administrative Billing:   Time spent with patient includes counseling and coordination of care regarding above diagnoses and treatment plan.    Patient Status:  This is a continuous care patient and medications will be prescribed by the  psychiatric provider until further indicated.    Signed:   Gely Salas PMCONORP-BC   Psychiatry

## 2024-03-04 NOTE — PATIENT INSTRUCTIONS
"Patient Education   The Panel Psychiatry Program  What to Expect  Here's what to expect in the Panel Psychiatry Program.   About the program  You'll be meeting with a psychiatric doctor to check your mental health. A psychiatric doctor helps you deal with troubling thoughts and feelings by giving you medicine. They'll make sure you know the plan for your care. You may see them for a long time. When you're feeling better, they may refer you back to seeing your family doctor.   If you have any questions, we'll be glad to talk to you.  About visits  Be open  At your visits, please talk openly about your problems. It may feel hard, but it's the best way for us to help you.  Cancelling visits  If you can't come to your visit, please call us right away at [phone]. If you don't cancel at least 24 hours (1 full day) before your visit, that's \"late cancellation.\"  Not showing up for your visits  Being very late is the same as not showing up. You'll be a \"no show\" if:  You're more than 15 minutes late for a 30-minute (half hour) visit.  You're more than 30 minutes late for a 60-minute (full hour) visit.  If you cancel late or don't show up 2 times within 6 months, we may end your care.  Getting help between visits  If you need help between visits, you can call us Monday to Friday from 8 a.m. to 4:30 p.m. at [phone].  Emergency care  Call 911 or go to the nearest emergency department if your life or someone else's life is in danger.  Call 988 anytime to reach the national Suicide and Crisis hotline.  Medicine refills  To refill your medicine, call your pharmacy. You can also call [facility]'s Behavioral Access at [phone], Monday to Friday, 8 a.m. to 4:30 p.m. It can take 1 to 3 business days to get a refill.   Forms, letters, and tests  You may have papers to fill out, like FMLA, short-term disability, and workability. We can help you with these forms at your visits, but you must have an " appointment. You may need more than 1 visit for this, to be in an intensive therapy program, or both.  Before we can give you medicine for ADHD, we may refer you to get tested for it or confirm it another way.  We may not be able to give you an emotional support animal letter.  We don't do mental health checks ordered by the court.   We don't do mental health testing, but we can refer you to get tested.   Thank you for choosing us for your care.  For informational purposes only. Not to replace the advice of your health care provider. Copyright   2022 [employer]. All rights reserved. [employer] [id number] - 12/22.       Treatment Plan:      1.  Continue Concerta 27 mg daily  2.  Continue escitalopram 10 mg daily  3.  Continue lamotrigine 100 mg 2 times daily    Continue all other medical directions per primary care provider.   Continue all other medications as reviewed per electronic medical record today.   Safety plan reviewed. To the Emergency Department as needed or call after hours crisis line at [phone] or [phone]. [place] Crisis Text Line: Text [place] to 333603  or  Suicide LifeLine Chat: suicidepreventionlifeline.org/chat/  To schedule individual or family therapy, call [facility] at [phone].   Schedule an appointment with me in 6 weeks or sooner as needed.  Call [facility] at [phone] to schedule.  Follow up with primary care provider as planned or for acute medical concerns.  Call the psychiatric nurse line with medication questions or concerns at [phone].  Quikr Indiahart may be used to communicate with your provider, but this is not intended to be used for emergencies.        Crisis Resources   The EmPath is an adults only unit located at [facility] is a short term (generally less than 23 hour stay) designed for crisis intervention and stabilization. Pts have the opportunity to meet quickly with a behavioral health team for  evaluation in a calm and peaceful therapuetic environment. To be evaluated for admission pts are triaged throught the [facility] ED.      The following hotlines are for both adults and children. The and are open 24 hours a day, 7 days a week unless noted otherwise.        Crisis Lines      Crisis Text Line  Text 309050  You will be connected with a trained live crisis counselor to provide support.        Gambling Hotline  9.309.001.2008 [hope]        línea de crisis española  691.140.5530        [place] [employer] Helpline  960.011.4852        National Hope Line  3.616.190.5743 [hope]        National Suicide Prevention Lifeline  Free and confidential support  988 or 1.947.296.TALK [8255]  http://suicidepreventionlifeline.org        The Isma Project (LGBTQ Youth Crisis Line)  2.859.738.5682  text START to 884-139        's Crisis Line  4.273.096.7930 (Press 1)  or text 328139    [facility] Mental Health Crisis Response  Within [place], call **CRISIS [**675827] to be connected to a mental health professional who can assist you.        [facility] Crisis  [phone]      [facility] Mobile Crisis  [phone]      [facility] Crisis  [phone]      [facility] Mobile Crisis  [phone] (adults)  [phone] (children)      [facility] Mobile Crisis  [phone] (adults)  [phone] (children)      [facility] Mobile Crisis  [phone]      [facility] Mobile Crisis  [phone]    Community Resources      Fast Tracker  Linking people to mental health and substance use disorder resources  fasttrackermn.org        [place] Mental Health Warmline  Peer to peer support  5 pm to 9 am 7 days/week  4.671.385.7063  https://mnwitw.org/shiva        National [place] on Mental Illness (POLA)  951.233.8219 or 1.888.POLA.HELPS  https://namimn.org/        [facility]  [facility] residents [address]  [facility]  [phone]        [facility]  Free mental health counseling  https://walkin.org/  612.870.0565 X2    Mental Health Apps      Calm Harm  https://calmharm.co.uk/      My3  https://my3app.org/      Devika Safety Plan  https://www.mysafetyplan.org/

## 2024-05-02 ENCOUNTER — VIRTUAL VISIT (OUTPATIENT)
Dept: PSYCHIATRY | Facility: CLINIC | Age: 29
End: 2024-05-02
Payer: COMMERCIAL

## 2024-05-02 DIAGNOSIS — F41.1 GAD (GENERALIZED ANXIETY DISORDER): ICD-10-CM

## 2024-05-02 DIAGNOSIS — F90.0 ATTENTION DEFICIT HYPERACTIVITY DISORDER (ADHD), PREDOMINANTLY INATTENTIVE TYPE: ICD-10-CM

## 2024-05-02 DIAGNOSIS — F31.61 BIPOLAR DISORDER, CURRENT EPISODE MIXED, MILD (H): Primary | ICD-10-CM

## 2024-05-02 PROCEDURE — 99214 OFFICE O/P EST MOD 30 MIN: CPT | Mod: 95 | Performed by: NURSE PRACTITIONER

## 2024-05-02 RX ORDER — METHYLPHENIDATE HYDROCHLORIDE 18 MG/1
18 TABLET ORAL EVERY MORNING
Qty: 30 TABLET | Refills: 0 | Status: SHIPPED | OUTPATIENT
Start: 2024-05-02 | End: 2024-05-31

## 2024-05-02 RX ORDER — ATOMOXETINE 18 MG/1
18 CAPSULE ORAL DAILY
Qty: 30 CAPSULE | Refills: 3 | Status: SHIPPED | OUTPATIENT
Start: 2024-05-02 | End: 2024-08-20

## 2024-05-02 ASSESSMENT — ANXIETY QUESTIONNAIRES
1. FEELING NERVOUS, ANXIOUS, OR ON EDGE: NOT AT ALL
GAD7 TOTAL SCORE: 3
5. BEING SO RESTLESS THAT IT IS HARD TO SIT STILL: NOT AT ALL
3. WORRYING TOO MUCH ABOUT DIFFERENT THINGS: NOT AT ALL
GAD7 TOTAL SCORE: 3
2. NOT BEING ABLE TO STOP OR CONTROL WORRYING: NOT AT ALL
6. BECOMING EASILY ANNOYED OR IRRITABLE: NEARLY EVERY DAY
IF YOU CHECKED OFF ANY PROBLEMS ON THIS QUESTIONNAIRE, HOW DIFFICULT HAVE THESE PROBLEMS MADE IT FOR YOU TO DO YOUR WORK, TAKE CARE OF THINGS AT HOME, OR GET ALONG WITH OTHER PEOPLE: SOMEWHAT DIFFICULT
8. IF YOU CHECKED OFF ANY PROBLEMS, HOW DIFFICULT HAVE THESE MADE IT FOR YOU TO DO YOUR WORK, TAKE CARE OF THINGS AT HOME, OR GET ALONG WITH OTHER PEOPLE?: SOMEWHAT DIFFICULT
GAD7 TOTAL SCORE: 3
7. FEELING AFRAID AS IF SOMETHING AWFUL MIGHT HAPPEN: NOT AT ALL
7. FEELING AFRAID AS IF SOMETHING AWFUL MIGHT HAPPEN: NOT AT ALL
4. TROUBLE RELAXING: NOT AT ALL

## 2024-05-02 ASSESSMENT — PAIN SCALES - GENERAL: PAINLEVEL: NO PAIN (0)

## 2024-05-02 NOTE — PROGRESS NOTES
"Virtual Visit Details    Type of service:  Video Visit   Video Start Time: 10:48 AM  Video End Time:11:12 AM    Originating Location (pt. Location): Home    Distant Location (provider location):  On-site  Platform used for Video Visit: [facility]           Outpatient Psychiatric Progress Note    Name: Marcela Harris   : 1995                    Primary Care Provider: No primary care provider on file.   Therapist: None    PHQ-9 scores:      2021    12:00 PM 2021     3:38 PM 2023    10:47 AM   PHQ-9 SCORE   PHQ-9 Total Score MyChart   5 (Mild depression)   PHQ-9 Total Score 10 3 5       JAISON-7 scores:      2021    12:00 PM 2021     3:38 PM   JAISON-7 SCORE   Total Score 11 1       Patient Identification:    Patient is a 29 year old year old, single  White Not  or  female  who presents for return visit with me.  Patient is currently employed full time. Patient attended the session alone. Patient prefers to be called: \" Marcela\".    Current medications include:   Current Outpatient Medications   Medication Sig Dispense Refill    escitalopram (LEXAPRO) 10 MG tablet Take 1 tablet (10 mg) by mouth daily 30 tablet 3    etonogestrel (NEXPLANON) 68 MG IMPL 1 each by Subdermal route once      lamoTRIgine (LAMICTAL) 100 MG tablet Take 1 tablet (100 mg) by mouth 2 times daily 60 tablet 3    methylphenidate HCL ER, OSM, (CONCERTA) 27 MG CR tablet Take 1 tablet (27 mg) by mouth every morning 30 tablet 0     No current facility-administered medications for this visit.        The [place] Prescription Monitoring Program has been reviewed and there are no concerns about diversionary activity for controlled substances at this time.      I was able to review most recent Primary Care Provider, specialty provider, and therapy visit notes that I have access to.     Today, patient reports that her grandfather passed away.  She had SI right after hearing about her  grandfather dying.    Medication does not " work later  in the day. With the concerta - when she misses a day - has a craving for sweets the next day and a strange taste in her mouth.  She has gained weight.  Work is going all right.  Recently she went to ED for back pain.      has a past medical history of Attention deficit hyperactivity disorder (ADHD), predominantly inattentive type, Contraceptive management, Gastroesophageal reflux disease without esophagitis, and Seizure disorder (H) (2000).    Social history updates:    No change in her social history to report today    Substance use updates:    No alcohol use reported  Tobacco use: No    Vital Signs:   There were no vitals taken for this visit.    Labs:    Most recent laboratory results reviewed and no new labs.     Mental Status Examination:  Appearance: awake, alert  Attitude: cooperative  Eye Contact:   Not able to assess  Gait and Station: No dizziness or falls  Psychomotor Behavior:   Not able to assess  Oriented to:  time, person, and place  Attention Span and Concentration:  Normal  Speech:   vtspeech: clear, coherent and Speaks: English  Mood:  anxious and depressed  Affect:  mood congruent  Associations:  no loose associations  Thought Process:  goal oriented  Thought Content:  no evidence of suicidal ideation or homicidal ideation, no auditory hallucinations present, and no visual hallucinations present  Recent and Remote Memory:  intact Not formally assessed. No amnesia.  Fund of Knowledge: appropriate  Insight:  good  Judgment: good  Impulse Control:  good    Suicide Risk Assessment:  Today Marcela Harris reports no thoughts to harm themself or others. In addition, there are notable risk factors for self-harm, including single status, anxiety, and mood change. However, risk is mitigated by commitment to family, history of seeking help when needed, future oriented, denies suicidal intent or plan, and denies homicidal ideation, intent, or plan. Therefore, based on all available evidence  including the factors cited above, Marcela Harris does not appear to be at imminent risk for self-harm, does not meet criteria for a 72-hr hold, and therefore remains appropriate for ongoing outpatient level of care.  A thorough assessment of risk factors related to suicide and self-harm have been reviewed and are noted above. The patient convincingly denies suicidality on several occasions. Local community safety resources printed and reviewed for patient to use if needed. There was no deceit detected, and the patient presented in a manner that was believable.     DSM5 Diagnosis:  Attention-Deficit/Hyperactivity Disorder  314.00 (F90.0) Predominantly inattentive presentation  296.89 Bipolar II Disorder With mixed features and mild  300.02 (F41.1) Generalized Anxiety Disorder    Medical comorbidities include:   Patient Active Problem List    Diagnosis Date Noted    Attention deficit hyperactivity disorder (ADHD), predominantly inattentive type      Priority: Medium    Gastroesophageal reflux disease without esophagitis      Priority: Medium    Seizure disorder (H)      Priority: Medium     TBI with brain tumor         Assessment:    Marcela Harris told me that her grandfather  and she has had a difficult time processing this.  Initially she had some thoughts of not wanting to live but tells me that has passed.  She is concerned about withdrawal side effects from the Concerta when she misses even 1 day.  I decreased the dose to 18 mg daily and we will add Strattera to try and level out the medication for her.  Lexapro continues daily as well as lamotrigine twice daily for mood regulation..    Medication side effects and alternatives were reviewed. Health promotion activities recommended and reviewed today. All questions addressed. Education and counseling completed regarding risks and benefits of medications and psychotherapy options.    Treatment Plan:      1.  Decrease Concerta to 18 mg daily  2.  Add Strattera  18 mg daily  3.  Continue Lexapro 10 mg daily  4.  Continue lamotrigine 100 mg 2 times daily    Continue all other medications as reviewed per electronic medical record today.   Safety plan reviewed. To the Emergency Department as needed or call after hours crisis line at [phone] or [phone]. [place] Crisis Text [place]. Text [place] to 381881 or Suicide LifeLine Chat: suicideEnchanted Diamonds.org/chat/  To schedule individual or family therapy, call [facility] at [phone]  Schedule an appointment with me in June or sooner as needed. Call [facility] at [phone] to schedule.  Follow up with primary care provider as planned or for acute medical concerns.  Call the psychiatric nurse line with medication questions or concerns at [phone]  MyChart may be used to communicate with your provider, but this is not intended to be used for emergencies.        Crisis Resources   The EmPath is an adults only unit located at [facility] is a short term (generally less than 23 hour stay) designed for crisis intervention and stabilization. Pts have the opportunity to meet quickly with a behavioral health team for evaluation in a calm and peaceful therapuetic environment. To be evaluated for admission pts are triaged throught the [facility] ED.      The following hotlines are for both adults and children. The and are open 24 hours a day, 7 days a week unless noted otherwise.        Crisis Lines      Crisis Text Line  Text 811720  You will be connected with a trained live crisis counselor to provide support.        [employer] Hotline  6.834.783.5471 [hope]        línea de crisis española  [phone]        [facility]  [phone]        National Hope Line  5.931.274.4220 [hope]        National Suicide Prevention Lifeline  Free and confidential support  988 or 3.600.844.TALK [5455]  http://suicidepreventionlifeline.org        The Isma  Project (LGBTQ Youth Crisis Line)  0.192.316.8665  text START to 877-956        [place] Crisis Line  6.184.151.0387 (Press 1)  or text 665320    [facility] Mental Health Crisis Response  Within [place], call **CRISIS [**572586] to be connected to a mental health professional who can assist you.        [facility] Crisis  [phone]      [facility] Mobile Crisis  [phone]      [facility] Crisis  [phone]      [facility] Mobile Crisis  [phone] (adults)  [phone] (children)      [facility] Mobile Crisis  [phone] (adults)  [phone] (children)      [facility] Mobile Crisis  [phone]      [facility] Mobile Crisis  [phone]    Community Resources      Fast Tracker  Linking people to mental health and substance use disorder resources  [employer] TurbineckStartcappsn.org        [place] Mental Health Warmline  Peer to peer support  5 pm to 9 am 7 days/week  1.659.738.4814  https://mnwitw.org/shiva        National [place] on Mental Illness (POLA)  009.028.5582 or 1.888.POLA.HELPS  https://namimn.org/        [facility] Urgent Care for Adult Mental Health  [facility] residents [address]  [phone]        [facility]  Free mental health counseling  https://walkin.org/  612.870.0565 X2    Mental Health Apps      Calm Harm  https://calmharm.co.uk/      My3  https://my3app.org/      [employer] Safety Plan  https://www.mysafetyplan.org/   Administrative Billing:   Time spent with patient includes counseling and coordination of care regarding above diagnoses and treatment plan.    Patient Status:  This is a continuous care patient and medications will be prescribed by the psychiatric provider until further indicated.    Signed:   BIJU Rebolledo-BC   Psychiatry

## 2024-05-02 NOTE — PATIENT INSTRUCTIONS
"Patient Education   The Panel Psychiatry Program  What to Expect  Here's what to expect in the Panel Psychiatry Program.   About the program  You'll be meeting with a psychiatric doctor to check your mental health. A psychiatric doctor helps you deal with troubling thoughts and feelings by giving you medicine. They'll make sure you know the plan for your care. You may see them for a long time. When you're feeling better, they may refer you back to seeing your family doctor.   If you have any questions, we'll be glad to talk to you.  About visits  Be open  At your visits, please talk openly about your problems. It may feel hard, but it's the best way for us to help you.  Cancelling visits  If you can't come to your visit, please call us right away at [phone]. If you don't cancel at least 24 hours (1 full day) before your visit, that's \"late cancellation.\"  Not showing up for your visits  Being very late is the same as not showing up. You'll be a \"no show\" if:  You're more than 15 minutes late for a 30-minute (half hour) visit.  You're more than 30 minutes late for a 60-minute (full hour) visit.  If you cancel late or don't show up 2 times within 6 months, we may end your care.  Getting help between visits  If you need help between visits, you can call us Monday to Friday from 8 a.m. to 4:30 p.m. at [phone].  Emergency care  Call 911 or go to the nearest emergency department if your life or someone else's life is in danger.  Call 988 anytime to reach the national Suicide and Crisis hotline.  Medicine refills  To refill your medicine, call your pharmacy. You can also call [facility]'s Behavioral Access at [phone], Monday to Friday, 8 a.m. to 4:30 p.m. It can take 1 to 3 business days to get a refill.   Forms, letters, and tests  You may have papers to fill out, like FMLA, short-term disability, and workability. We can help you with these forms at your visits, but you must have an " appointment. You may need more than 1 visit for this, to be in an intensive therapy program, or both.  Before we can give you medicine for ADHD, we may refer you to get tested for it or confirm it another way.  We may not be able to give you an emotional support animal letter.  We don't do mental health checks ordered by the court.   We don't do mental health testing, but we can refer you to get tested.   Thank you for choosing us for your care.  For informational purposes only. Not to replace the advice of your health care provider. Copyright   2022 [facility] XTWIP. All rights reserved. [employer] 243653 - 12/22.       Treatment Plan:      1.  Decrease Concerta to 18 mg daily  2.  Add Strattera 18 mg daily  3.  Continue Lexapro 10 mg daily  4.  Continue lamotrigine 100 mg 2 times daily    Continue all other medical directions per primary care provider.   Continue all other medications as reviewed per electronic medical record today.   Safety plan reviewed. To the Emergency Department as needed or call after hours crisis line at [phone] or [phone]. [place] Crisis Text [place]: Text [place] to 831869  or  Suicide LifeLine Chat: suicidepreventionlifeline.org/chat/  To schedule individual or family therapy, call [facility] at [phone].   Schedule an appointment with me in 6 weeks or sooner as needed.  Call [facility] at [phone] to schedule.  Follow up with primary care provider as planned or for acute medical concerns.  Call the psychiatric nurse line with medication questions or concerns at [phone].  [employer] Gr[employer]pt may be used to communicate with your provider, but this is not intended to be used for emergencies.        Crisis Resources   The EmPath is an adults only unit located at [facility] in [place] is a short term (generally less than 23 hour stay) designed for crisis intervention and stabilization. Pts have the opportunity to meet quickly with a  behavioral health team for evaluation in a calm and peaceful therapuetic environment. To be evaluated for admission pts are triaged throught the [facility] ED.      The following hotlines are for both adults and children. The and are open 24 hours a day, 7 days a week unless noted otherwise.        Crisis Lines      Crisis Text Line  Text 565207  You will be connected with a trained live crisis counselor to provide support.        [employer] Hotline  3.853.144.6282 [hope]        línea de crisis española  386.733.9570        [place] Ave[employer]ant Helpline  [phone]        National Hope Line  4.562.657.4149 [hope]        National Suicide Prevention Lifeline  Free and confidential support  988 or 1.399.433.TALK [8255]  http://suicidepreventionlifeline.org        The Isma Project (LGBTQ Youth Crisis Line)  1.964.873.9416  text START to 678-616        [place]'s Crisis Line  4.681.798.7484 (Press 1)  or text 368364    [facility] Mental Health Crisis Response  Within [place], call **CRISIS [**383844] to be connected to a mental health professional who can assist you.        [facility] Crisis  [phone]      [facility] Mobile Crisis  [phone]      [facility] Crisis  [phone]      [facility] Mobile Crisis  [phone] (adults)  [phone] (children)      [facility] Mobile Crisis  [phone] (adults)  [phone] (children)      [facility] Mobile Crisis  [phone]      [facility] Mobile Crisis  [phone]    Community Resources      Fast Tracker  Linking people to mental health and substance use disorder resources  fasttrackermn.org        [place] Mental Health Warmline  Peer to peer support  5 pm to 9 am 7 days/week  9.963.309.5489  https://mnwitw.org/shiva        National [place] on Mental Illness (POLA)  883.036.1904 or 1.888.POLA.HELPS  https://namimn.org/        [facility] Urgent Care for Adult Mental Health  [facility] residents  [address]  [phone]        [facility]  Free mental health counseling  https://walkin.org/  [phone] X2    Mental Health Apps      Calm Harm  https://calmharm.co.uk/      My3  https://my3app.org/      [employer] Safety Plan  https://www.mysafetyplan.org/

## 2024-05-26 ENCOUNTER — HEALTH MAINTENANCE LETTER (OUTPATIENT)
Age: 29
End: 2024-05-26

## 2024-05-31 ENCOUNTER — MYC REFILL (OUTPATIENT)
Dept: PSYCHIATRY | Facility: CLINIC | Age: 29
End: 2024-05-31
Payer: COMMERCIAL

## 2024-05-31 DIAGNOSIS — F90.0 ATTENTION DEFICIT HYPERACTIVITY DISORDER (ADHD), PREDOMINANTLY INATTENTIVE TYPE: ICD-10-CM

## 2024-06-03 RX ORDER — METHYLPHENIDATE HYDROCHLORIDE 18 MG/1
18 TABLET ORAL EVERY MORNING
Qty: 30 TABLET | Refills: 0 | Status: SHIPPED | OUTPATIENT
Start: 2024-06-03 | End: 2024-07-10

## 2024-06-03 NOTE — TELEPHONE ENCOUNTER
Date of Last Office Visit: 5/2/24  Date of Next Office Visit: 6/21/24  No shows since last visit: 0  Cancellations since last visit: 0    Medication requested: methylphenidate HCL ER, OSM, (CONCERTA) 18 MG CR tablet  Date last ordered: 5/2/24 Qty: 30 Refills: 0     [place] of [place] ?: No, not a provider delegate    Lapse in medication adherence greater than 5 days?: No  If yes, call patient and gather details: na  Medication refill request verified as identical to current order?: yes  Result of Last DAM, VPA, Li+ Level, CBC, or Carbamazepine Level (at or since last visit): N/A      Last visit treatment plan: She is concerned about withdrawal side effects from the Concerta when she misses even 1 day. I decreased the dose to 18 mg daily and we will add Strattera to try and level out the medication for her. Lexapro continues daily as well as lamotrigine twice daily for mood regulation.   1.  Decrease Concerta to 18 mg daily  2.  Add Strattera 18 mg daily  3.  Continue Lexapro 10 mg daily  4.  Continue lamotrigine 100 mg 2 times daily    []Medication refilled per  Medication Refill in Ambulatory Care  policy.  [x]Medication unable to be refilled by RN due to criteria not met as indicated below:    []Eligibility - not seen in the last year   []Supervision - no future appointment   []Compliance - no shows, cancellations or lapse in therapy   []Verification - order discrepancy   [x]Controlled medication   []Medication not included in policy   []90-day supply request   []Other

## 2024-07-09 ENCOUNTER — TELEPHONE (OUTPATIENT)
Dept: PSYCHIATRY | Facility: CLINIC | Age: 29
End: 2024-07-09

## 2024-07-09 DIAGNOSIS — F90.0 ATTENTION DEFICIT HYPERACTIVITY DISORDER (ADHD), PREDOMINANTLY INATTENTIVE TYPE: ICD-10-CM

## 2024-07-09 NOTE — TELEPHONE ENCOUNTER
Reason for call:  Medication   If this is a refill request, has the caller requested the refill from the pharmacy already? N/A  Will the patient be using a [facility]? No  Name of the pharmacy and phone number for the current request: [facility] in [place]    Name of the medication requested: concerta    Other request: Pt would like to speak with a nurse re:concerns about her atomoxetime.    Phone number to reach patient:  Home number on file [phone] (home)    Best Time:  any    Can we leave a detailed message on this number?  YES    Travel screening: Not Applicable

## 2024-07-10 NOTE — TELEPHONE ENCOUNTER
"Returned a call to the patient to discuss her concerns about Atomoxetine.     She reports that 2 weeks ago, she had an episode where she felt exhausted during the day so she took a 3 hour nap in the afternoon. When she woke from her nap, she thought it was morning so she started her way to work. On the way to work, a  pulled her over because they thought she was impaired. She did not seek treatment at this time. She stopped taking her Atomoxetine because she thought this was the reason for the episode. She has not had an episode like this since the initial episode    At last visit 5/2/24, Concerta was reduced to 18mg daily and Strattera was started 18mg daily. She has been taking these medications at these doses since this time. She has noticed weight gain, excessive thirst, constipation.     She also wanted to mention that she has \"white stuff\" coming out of her tonsils. She describes it as \"boogers.\" Wanted to let Gely Salas know.     Patient is concerned about what happened and wanted to see what Gely Salas's thoughts were and to let her know that she stopped the Atomoxetine. Pt wants to know if Atomoxetine caused this incident             Date of Last Office Visit: 5/2/24  Date of Next Office Visit: None; routing for [facility] to assist pt with scheduling.  Patient will schedule  No shows since last visit: No  More than one patient-initiated cancellation (with reschedule) since last seen in clinic? No    []Medication refilled per  Medication Refill in Ambulatory Care  policy.  [x]Medication unable to be refilled by RN due to criteria not met as indicated below:    []Eligibility: has not had a provider visit within last 6 months   [x]Supervision: no future appointment; < 7 days before next appointment   []Compliance: no shows; cancellations; lapse in therapy   []Verification: order discrepancy; may need modification...   []90-day supply request   []Advanced refill request: > 7 days before " refill date   [x]Controlled medication   []Medication not included in policy   []Review: new med; med adjusted ? 30 days; safety alert; requires lab monitoring...   []Scope of Practice: refill request processed by LPN/MA   []Other:      Medication(s) requested:     -  methylphenidate HCl ER, OSM, (CONCERTA) 18 MG CR tablet   Date last ordered: 6/3/24  Qty: 30  Refills: 0      Any Controlled Substance(s)? Yes  [place]   methylphenidate HCl ER, OSM, (CONCERTA) 18 MG CR tablet  was last sold on 6/4/24 for quantity of 30.  Other controlled substance on [place] ?: No    Requested medication(s) verified as identical to current order? Yes    Any lapse in adherence to medication(s) greater than 5 days? No      Additional action taken? contacted patient via phone   .    Last visit treatment plan:     Treatment Plan:        1.  Decrease Concerta to 18 mg daily  2.  Add Strattera 18 mg daily  3.  Continue Lexapro 10 mg daily  4.  Continue lamotrigine 100 mg 2 times daily       Any medication(s) require lab monitoring? No

## 2024-07-11 NOTE — TELEPHONE ENCOUNTER
Called patient and told her that Bernice wasn't sure if the Strattera was the cause or not. I told her it's good she stopped it and if she has another episode to let us know.     She said the throat issue she has had her whole life so she didn't know if she wanted to go to the doctor. I told her if it got worse or unmanageable to go in.     She also needs her Concerta sent to pharmacy.     Tori Argueta RN on 7/11/2024 at 4:09 PM

## 2024-07-12 RX ORDER — METHYLPHENIDATE HYDROCHLORIDE 18 MG/1
18 TABLET ORAL EVERY MORNING
Qty: 30 TABLET | Refills: 0 | Status: SHIPPED | OUTPATIENT
Start: 2024-07-12 | End: 2024-08-04

## 2024-08-04 ENCOUNTER — MYC REFILL (OUTPATIENT)
Dept: PSYCHIATRY | Facility: CLINIC | Age: 29
End: 2024-08-04
Payer: COMMERCIAL

## 2024-08-04 ENCOUNTER — MYC MEDICAL ADVICE (OUTPATIENT)
Dept: PSYCHIATRY | Facility: CLINIC | Age: 29
End: 2024-08-04
Payer: COMMERCIAL

## 2024-08-04 DIAGNOSIS — F90.0 ATTENTION DEFICIT HYPERACTIVITY DISORDER (ADHD), PREDOMINANTLY INATTENTIVE TYPE: ICD-10-CM

## 2024-08-05 DIAGNOSIS — F31.61 BIPOLAR DISORDER, CURRENT EPISODE MIXED, MILD (H): ICD-10-CM

## 2024-08-05 RX ORDER — ESCITALOPRAM OXALATE 10 MG/1
10 TABLET ORAL DAILY
Qty: 30 TABLET | Refills: 5 | Status: SHIPPED | OUTPATIENT
Start: 2024-08-05

## 2024-08-05 NOTE — TELEPHONE ENCOUNTER
Date of Last Office Visit: 2024  Date of Next Office Visit:  2024  No shows since last visit: No  More than one patient-initiated cancellation (with reschedule) since last seen in clinic? No - 1 cancellation on 2024    []Medication refilled per  Medication Refill in Ambulatory Care  policy.  [x]Medication unable to be refilled by RN due to criteria not met as indicated below:    []Eligibility: has not had a provider visit within last 6 months   []Supervision: no future appointment; < 7 days before next appointment   [x]Compliance: no shows; cancellations; lapse in therapy   []Verification: order discrepancy; may need modification...   [] > 30-day supply request   []Advanced refill request: > 7 days before refill date   [x]Controlled medication   []Medication not included in policy   []Review: new med; med adjusted ? 30 days; safety alert; requires lab monitoring...   []Scope of Practice: refill request processed by LPN/MA   []Other:      Medication(s) requested:   -  methylphenidate HCl ER, OSM, (CONCERTA) 18 MG CR tablet   Date last ordered: 2024  Qty: 30  Refills: 0  Appropriate for refill? Provider to review. RN cannot check  for WENDIE Salas NP      Any Controlled Substance(s)? Yes   [place]  checked? No; not current delegate      Requested medication(s) verified as identical to current order? Yes    Any lapse in adherence to medication(s) greater than 5 days? No      Additional action taken? routed encounter to provider for review.      Last visit treatment plan:   Assessment:   Marcela Harris told me that her grandfather  and she has had a difficult time processing this.  Initially she had some thoughts of not wanting to live but tells me that has passed.  She is concerned about withdrawal side effects from the Concerta when she misses even 1 day.  I decreased the dose to 18 mg daily and we will add Strattera to try and level out the medication for her.  Lexapro continues daily as well  as lamotrigine twice daily for mood regulation..     Medication side effects and alternatives were reviewed. Health promotion activities recommended and reviewed today. All questions addressed. Education and counseling completed regarding risks and benefits of medications and psychotherapy options.     Treatment Plan:    1.  Decrease Concerta to 18 mg daily  2.  Add Strattera 18 mg daily  3.  Continue Lexapro 10 mg daily  4.  Continue lamotrigine 100 mg 2 times daily     Continue all other medications as reviewed per electronic medical record today.   Safety plan reviewed. To the Emergency Department as needed or call after hours crisis line at [phone] or [phone]. [place] Crisis Text Line. Text [place] to 909439 or Suicide LifeLine Chat: suicidepreventionlifeline.org/chat/  To schedule individual or family therapy, call [facility] at [phone]  Schedule an appointment with me in June or sooner as needed. Call [facility] at [phone] to schedule.  Follow up with primary care provider as planned or for acute medical concerns.  Call the psychiatric nurse line with medication questions or concerns at [phone]  MyChart may be used to communicate with your provider, but this is not intended to be used for emergencies.       Any medication(s) require lab monitoring? No

## 2024-08-05 NOTE — TELEPHONE ENCOUNTER
Date of Last Office Visit: 05/02/2024  Date of Next Office Visit: None; routing for [facility] to assist pt with scheduling.    No shows since last visit: No  More than one patient-initiated cancellation (with reschedule) since last seen in clinic? Yes    []Medication refilled per  Medication Refill in Ambulatory Care  policy.  []Medication unable to be refilled by RN due to criteria not met as indicated below:    []Eligibility: has not had a provider visit within last 6 months   [x]Supervision: no future appointment; < 7 days before next appointment   []Compliance: no shows; cancellations; lapse in therapy   []Verification: order discrepancy; may need modification...   [] > 30-day supply request   []Advanced refill request: > 7 days before refill date   []Controlled medication   []Medication not included in policy   []Review: new med; med adjusted ? 30 days; safety alert; requires lab monitoring...   [x]Scope of Practice: refill request processed by LPN/MA   [x]Other:      Medication(s) requested:     -  escitalopram (LEXAPRO) 10 MG tablet   Date last ordered: 03/01/2024  Qty: 30  Refills: 3  Appropriate for refill? Yes      Any Controlled Substance(s)? No      Requested medication(s) verified as identical to current order? Yes    Any lapse in adherence to medication(s) greater than 5 days? No      Additional action taken? requested that A notify Nursing when appointment is scheduled and routed encounter to provider for review.      Last visit treatment plan:   Return in about 6 weeks (around 6/13/2024) for Medication followup.    1.  Decrease Concerta to 18 mg daily  2.  Add Strattera 18 mg daily  3.  Continue Lexapro 10 mg daily  4.  Continue lamotrigine 100 mg 2 times daily  Any medication(s) require lab monitoring? No

## 2024-08-06 RX ORDER — METHYLPHENIDATE HYDROCHLORIDE 18 MG/1
18 TABLET ORAL EVERY MORNING
Qty: 30 TABLET | Refills: 0 | Status: SHIPPED | OUTPATIENT
Start: 2024-08-09 | End: 2024-08-20

## 2024-08-20 ENCOUNTER — VIRTUAL VISIT (OUTPATIENT)
Dept: PSYCHIATRY | Facility: CLINIC | Age: 29
End: 2024-08-20
Payer: COMMERCIAL

## 2024-08-20 VITALS — BODY MASS INDEX: 24.22 KG/M2 | WEIGHT: 141.09 LBS

## 2024-08-20 DIAGNOSIS — F41.1 GAD (GENERALIZED ANXIETY DISORDER): ICD-10-CM

## 2024-08-20 DIAGNOSIS — F90.0 ATTENTION DEFICIT HYPERACTIVITY DISORDER (ADHD), PREDOMINANTLY INATTENTIVE TYPE: ICD-10-CM

## 2024-08-20 DIAGNOSIS — F31.61 BIPOLAR DISORDER, CURRENT EPISODE MIXED, MILD (H): Primary | ICD-10-CM

## 2024-08-20 PROCEDURE — 99214 OFFICE O/P EST MOD 30 MIN: CPT | Mod: 95 | Performed by: NURSE PRACTITIONER

## 2024-08-20 PROCEDURE — G2211 COMPLEX E/M VISIT ADD ON: HCPCS | Mod: 95 | Performed by: NURSE PRACTITIONER

## 2024-08-20 RX ORDER — METHYLPHENIDATE HYDROCHLORIDE 18 MG/1
18 TABLET ORAL EVERY MORNING
Qty: 30 TABLET | Refills: 0 | Status: SHIPPED | OUTPATIENT
Start: 2024-09-06

## 2024-08-20 RX ORDER — METHYLPHENIDATE HYDROCHLORIDE 18 MG/1
18 TABLET ORAL EVERY MORNING
Qty: 30 TABLET | Refills: 0 | Status: SHIPPED | OUTPATIENT
Start: 2024-11-01

## 2024-08-20 RX ORDER — LAMOTRIGINE 100 MG/1
100 TABLET ORAL 2 TIMES DAILY
Qty: 60 TABLET | Refills: 5 | Status: SHIPPED | OUTPATIENT
Start: 2024-08-20

## 2024-08-20 RX ORDER — METHYLPHENIDATE HYDROCHLORIDE 18 MG/1
18 TABLET ORAL EVERY MORNING
Qty: 30 TABLET | Refills: 0 | Status: SHIPPED | OUTPATIENT
Start: 2024-10-04

## 2024-08-20 ASSESSMENT — PAIN SCALES - GENERAL: PAINLEVEL: NO PAIN (0)

## 2024-08-20 NOTE — PATIENT INSTRUCTIONS
"Patient Education   The Panel Psychiatry Program  What to Expect  Here's what to expect in the Panel Psychiatry Program.   About the program  You'll be meeting with a psychiatric doctor to check your mental health. A psychiatric doctor helps you deal with troubling thoughts and feelings by giving you medicine. They'll make sure you know the plan for your care. You may see them for a long time. When you're feeling better, they may refer you back to seeing your family doctor.   If you have any questions, we'll be glad to talk to you.  About visits  Be open  At your visits, please talk openly about your problems. It may feel hard, but it's the best way for us to help you.  Cancelling visits  If you can't come to your visit, please call us right away at [phone]. If you don't cancel at least 24 hours (1 full day) before your visit, that's \"late cancellation.\"  Not showing up for your visits  Being very late is the same as not showing up. You'll be a \"no show\" if:  You're more than 15 minutes late for a 30-minute (half hour) visit.  You're more than 30 minutes late for a 60-minute (full hour) visit.  If you cancel late or don't show up 2 times within 6 months, we may end your care.  Getting help between visits  If you need help between visits, you can call us Monday to Friday from 8 a.m. to 4:30 p.m. at [phone].  Emergency care  Call 911 or go to the nearest emergency department if your life or someone else's life is in danger.  Call 988 anytime to reach the national Suicide and Crisis hotline.  Medicine refills  To refill your medicine, call your pharmacy. You can also call [facility] Behavioral Access at [phone], Monday to Friday, 8 a.m. to 4:30 p.m. It can take 1 to 3 business days to get a refill.   Forms, letters, and tests  You may have papers to fill out, like FMLA, short-term disability, and workability. We can help you with these forms at your visits, but you must have an " appointment. You may need more than 1 visit for this, to be in an intensive therapy program, or both.  Before we can give you medicine for ADHD, we may refer you to get tested for it or confirm it another way.  We may not be able to give you an emotional support animal letter.  We don't do mental health checks ordered by the court.   We don't do mental health testing, but we can refer you to get tested.   Thank you for choosing us for your care.  For informational purposes only. Not to replace the advice of your health care provider. Copyright   2022 [facility]. All rights reserved. [place] 206449 - 12/22.      Treatment Plan:      1.  Discontinue atomoxetine  2.  Continue escitalopram 10 mg daily for depression  3.  Continue lamotrigine 100 mg 2 times daily for mood regulation  4.  Continue Concerta 18 mg daily for ADHD symptom relief  5.  You will be contacted to be scheduled with a new psychiatry provider as I am retiring in September 13    Continue all other medical directions per primary care provider.   Continue all other medications as reviewed per electronic medical record today.   Safety plan reviewed. To the Emergency Department as needed or call after hours crisis line at [phone] or [phone]. [place] Crisis Text Line: Text [place] to 223954  or  Suicide LifeLine Chat: suicidepreventionlifeline.org/chat/  To schedule individual or family therapy, call [facility] at [phone].   Schedule an appointment in 3 months or sooner as needed.  Call [facility] at [phone] to schedule.  Follow up with primary care provider as planned or for acute medical concerns.  Call the psychiatric nurse line with medication questions or concerns at [phone].  [place] may be used to communicate with your provider, but this is not intended to be used for emergencies.        Crisis Resources   The EmPath is an adults only unit located at [facility] in [place]  is a short term (generally less than 23 hour stay) designed for crisis intervention and stabilization. Pts have the opportunity to meet quickly with a behavioral health team for evaluation in a calm and peaceful therapuetic environment. To be evaluated for admission pts are triaged throught the [facility] ED.      The following hotlines are for both adults and children. The and are open 24 hours a day, 7 days a week unless noted otherwise.        Crisis Lines      Crisis Text Line  Text 842174  You will be connected with a trained live crisis counselor to provide support.        Gambling Hotline  8.612.287.3320 [hope]        línea de crisis española  [phone]        [facility] Helpline  [phone]        National Hope Line  7.676.679.4070 [hope]        National Suicide Prevention Lifeline  Free and confidential support  988 or 1.756.856.TALK [8255]  http://suicidepreventionlifeline.org        The Isma Project (LGBTQ Youth Crisis Line)  5.178.400.0680  text START to 249-071        [place] Crisis Line  1.095.200.5393 (Press 1)  or text 956138    [facility] [place] Mental Health Crisis Response  Within [place], call **CRISIS [**796348] to be connected to a mental health professional who can assist you.        [facility] Crisis  [phone]      [facility] Mobile Crisis  [phone]      [facility] Crisis  [phone]      [facility] Mobile Crisis  [phone] (adults)  [phone] (children)      [facility] Mobile Crisis  [phone] (adults)  [phone] (children)      [facility] Mobile Crisis  [phone]      [facility] Mobile Crisis  [phone]    Community Resources      Fast Tracker  Linking people to mental health and substance use disorder resources  fasttrackermn.org        [place] Mental Health Warmline  Peer to peer support  5 pm to 9 am 7 days/week  7.830.341.8364  https://mnwitw.org/shiva        National [place] on Mental  Illness (POLA)  541.228.3999 or 1.888.POLA.HELPS  https://namimn.org/        [facility]  [facility] residents [place]   [address] [facility]  [phone]        [facility]  Free mental health counseling  https://walkin.org/  612.870.0565 X2    Mental Health Apps      Calm Harm  https://calmharm.co.uk/      My3  https://my3app.org/      Devika Safety Plan  https://www.mysafetyplan.org/

## 2024-08-20 NOTE — NURSING NOTE
Current patient location: [facility]  [place] [address]    Is the patient currently in the state of [place]? YES    Visit mode:VIDEO    If the visit is dropped, the patient can be reconnected by: VIDEO VISIT: Text to cell phone:   Telephone Information:   Mobile [phone]       Will anyone else be joining the visit? NO  (If patient encounters technical issues they should call [phone])    How would you like to obtain your AVS? MyChart    Are changes needed to the allergy or medication list? No    Are refills needed on medications prescribed by this physician? NO    Rooming Documentation:  Questionnaire(s) completed      Reason for visit: Video Visit (Recheck)    Jahaira HOLMAN

## 2024-08-20 NOTE — PROGRESS NOTES
"Virtual Visit Details    Type of service:  Video Visit   Video Start Time: 9:07 AM  Video End Time: 9:38 AM    Originating Location (pt. Location): Home    Distant Location (provider location):  Off-site  Platform used for Video Visit: [facility]         Outpatient Psychiatric Progress Note    Name: Marcela Harris   : 1995                    Primary Care Provider: DONN Campos CNP   Therapist: None    PHQ-9 scores:      2021    12:00 PM 2021     3:38 PM 2023    10:47 AM   PHQ-9 SCORE   PHQ-9 Total Score MyChart   5 (Mild depression)   PHQ-9 Total Score 10 3 5       JAISON-7 scores:      2021    12:00 PM 2021     3:38 PM 2024    10:20 AM   JAISON-7 SCORE   Total Score   3 (minimal anxiety)   Total Score 11 1 3       Patient Identification:    Patient is a 29 year old year old, single  White Not  or  female  who presents for return visit with me.  Patient is currently employed part time. Patient attended the session alone. Patient prefers to be called: \" Marcela\".    Current medications include:   Current Outpatient Medications   Medication Sig Dispense Refill    atomoxetine (STRATTERA) 18 MG capsule Take 1 capsule (18 mg) by mouth daily 30 capsule 3    escitalopram (LEXAPRO) 10 MG tablet Take 1 tablet (10 mg) by mouth daily 30 tablet 5    etonogestrel (NEXPLANON) 68 MG IMPL 1 each by Subdermal route once      lamoTRIgine (LAMICTAL) 100 MG tablet Take 1 tablet (100 mg) by mouth 2 times daily 60 tablet 3    methylphenidate HCl ER, OSM, (CONCERTA) 18 MG CR tablet Take 1 tablet (18 mg) by mouth every morning 30 tablet 0     No current facility-administered medications for this visit.        The [place] Prescription Monitoring Program has been reviewed and there are no concerns about diversionary activity for controlled substances at this time.      I was able to review most recent Primary Care Provider, specialty provider, and therapy visit notes that I have access to. "     Today, patient reports she contracted COVID and had a lot of coughing.   She has gained 8 pounds.  No Depression.  Some anxiety.  When she takes the lexapro she will feel more stable.  Irritable at work and hard to sleep.  Hypersexual.       has a past medical history of Attention deficit hyperactivity disorder (ADHD), predominantly inattentive type, Contraceptive management, Gastroesophageal reflux disease without esophagitis, and Seizure disorder (H) (2000).    Social history updates:    She continues to live with her father and work at [employer].    Substance use updates:    No alcohol use reported  Tobacco use: No    Vital Signs:   There were no vitals taken for this visit.    Labs:    Most recent laboratory results reviewed and no new labs.     Mental Status Examination:  Appearance: awake, alert and casual dress  Attitude: cooperative  Eye Contact:  adequate  Gait and Station: No dizziness or falls  Psychomotor Behavior:  intact station, gait and muscle tone  Oriented to:  time, person, and place  Attention Span and Concentration:  Normal  Speech:   vtspeech: clear, coherent and Speaks: English  Mood:  anxious and depressed  Affect:  mood congruent  Associations:  no loose associations  Thought Process:  goal oriented  Thought Content:  no evidence of suicidal ideation or homicidal ideation, no auditory hallucinations present, and no visual hallucinations present  Recent and Remote Memory:  intact Not formally assessed. No amnesia.  Fund of Knowledge: appropriate  Insight:  good  Judgment: good  Impulse Control:  good    Suicide Risk Assessment:  Today Marcela Harris reports no thoughts to harm themself or others. In addition, there are notable risk factors for self-harm, including single status and anxiety. However, risk is mitigated by commitment to family, history of seeking help when needed, future oriented, denies suicidal intent or plan, and denies homicidal ideation, intent, or plan. Therefore, based  on all available evidence including the factors cited above, Marcela Harris does not appear to be at imminent risk for self-harm, does not meet criteria for a 72-hr hold, and therefore remains appropriate for ongoing outpatient level of care.  A thorough assessment of risk factors related to suicide and self-harm have been reviewed and are noted above. The patient convincingly denies suicidality on several occasions. Local community safety resources printed and reviewed for patient to use if needed. There was no deceit detected, and the patient presented in a manner that was believable.     DSM5 Diagnosis:  Attention-Deficit/Hyperactivity Disorder  314.00 (F90.0) Predominantly inattentive presentation  296.89 Bipolar II Disorder With mixed features and mild  300.02 (F41.1) Generalized Anxiety Disorder    Medical comorbidities include:   Patient Active Problem List    Diagnosis Date Noted    Attention deficit hyperactivity disorder (ADHD), predominantly inattentive type      Priority: Medium    Gastroesophageal reflux disease without esophagitis      Priority: Medium    Seizure disorder (H) 2000     Priority: Medium     TBI with brain tumor         Assessment:    Marcela Harris had stopped taking the atomoxetine due to not feeling well when taking it.  Lexapro and lamotrigine are helping her mood regulate better.  Concerta is helpful for symptom relief of ADHD.  She denies any suicide thinking.  At times, she identifies mild periods of hypomania, but overall feels things are in control at this time.  She denies any suicide thinking..    Medication side effects and alternatives were reviewed. Health promotion activities recommended and reviewed today. All questions addressed. Education and counseling completed regarding risks and benefits of medications and psychotherapy options.    Treatment Plan:      1.  Discontinue atomoxetine  2.  Continue escitalopram 10 mg daily for depression  3.  Continue lamotrigine 100 mg 2  times daily for mood regulation  4.  Continue Concerta 18 mg daily for ADHD symptom relief  5.  You will be contacted to be scheduled with a new psychiatry provider as I am retiring in September 13    Continue all other medications as reviewed per electronic medical record today.   Safety plan reviewed. To the Emergency Department as needed or call after hours crisis line at [phone] or [phone]. [place] Crisis Text Line. Text [place] to 349613 or Suicide LifeLine Chat: suicidepreventionlifeline.org/chat/  To schedule individual or family therapy, call [facility] at [phone]  Schedule an appointment  in 3 months or sooner as needed. Call [facility] at [phone] to schedule.  Follow up with primary care provider as planned or for acute medical concerns.  Call the psychiatric nurse line with medication questions or concerns at [phone]  MyChart may be used to communicate with your provider, but this is not intended to be used for emergencies.        Crisis Resources   The EmPath is an adults only unit located at [facility] is a short term (generally less than 23 hour stay) designed for crisis intervention and stabilization. Pts have the opportunity to meet quickly with a behavioral health team for evaluation in a calm and peaceful therapuetic environment. To be evaluated for admission pts are triaged throught the [facility] ED.      The following hotlines are for both adults and children. The and are open 24 hours a day, 7 days a week unless noted otherwise.        Crisis Lines      Crisis Text Line  Text 466586  You will be connected with a trained live crisis counselor to provide support.        [place]ambling Hotline  3.334.757.4022 [hope]        línea de crisis española  [phone]        [facility] [employer] Helpline  [phone]        National Hope Line  8.539.829.4263 [hope]        National Suicide Prevention Lifeline  Free and  confidential support  988 or 1.999.662.TALK [9790]  http://suicidepreventionlifeline.org        The Isma Project (LGBTQ Youth Crisis Line)  5.536.001.9143  text START to 218-031        's Crisis Line  5.516.759.5452 (Press 1)  or text 632723    [facility] Mental Health Crisis Response  Within [place], call **CRISIS [**582759] to be connected to a mental health professional who can assist you.        [facility] Crisis  [phone]      [facility] Mobile Crisis  [phone]      [facility] Crisis  [phone]      [facility] Mobile Crisis  [phone] (adults)  [phone] (children)      [facility] Crisis  [phone] (adults)  [phone] (children)      [facility] Mobile Crisis  [phone]      [facility] Mobile Crisis  [phone]    Community Resources      Fast Tracker  Linking people to mental health and substance use disorder resources  fasttrackermn.org        [place] Mental Health Warmline  Peer to peer support  5 pm to 9 am 7 days/week  9.404.747.5004  https://mnwitw.org/shiva        National [place] on Mental Illness (POLA)  903.303.1977 or 1.888.POLA.HELPS  https://namimn.org/        [facility] for Adult [facility]  [facility] residents [address]  [phone]        [facility]  Free mental health counseling  https://walkin.org/  [phone] X2    Mental Health Apps      Calm Harm  https://calmharm.co.uk/      My3  https://my3app.org/      Devika Safety Plan  https://www.mysafetyplan.org/   Administrative Billing:   Time spent with patient includes counseling and coordination of care regarding above diagnoses and treatment plan.    The longitudinal plan of care for the diagnosis(es)/condition(s) as documented were addressed during this visit. Due to the added complexity in care, I will continue to support Marcela in the subsequent management and  with ongoing continuity of care.      Patient Status:  This is a continuous care patient and medications will be prescribed by the psychiatric provider until further indicated.    Signed:   BIJU Rebolledo-BC   Psychiatry

## 2025-01-19 ENCOUNTER — MYC REFILL (OUTPATIENT)
Dept: PSYCHIATRY | Facility: CLINIC | Age: 30
End: 2025-01-19
Payer: COMMERCIAL

## 2025-01-19 DIAGNOSIS — F90.0 ATTENTION DEFICIT HYPERACTIVITY DISORDER (ADHD), PREDOMINANTLY INATTENTIVE TYPE: ICD-10-CM

## 2025-01-19 RX ORDER — METHYLPHENIDATE HYDROCHLORIDE 18 MG/1
18 TABLET ORAL EVERY MORNING
Qty: 30 TABLET | Refills: 0 | Status: CANCELLED | OUTPATIENT
Start: 2025-01-19

## 2025-01-20 NOTE — TELEPHONE ENCOUNTER
1. RN received refill request.    2. Per transfer plan Behavioral Health Access list: Patient already has appt set up for 11/25/24 mychal laureano 11/25/24.    3. RN called patient to verify whether patient kept appointment on 11/25/24 and reached Voice mail. RN requested patient update.

## 2025-03-20 ENCOUNTER — OFFICE VISIT (OUTPATIENT)
Dept: INTERNAL MEDICINE | Facility: CLINIC | Age: 30
End: 2025-03-20

## 2025-03-20 VITALS
SYSTOLIC BLOOD PRESSURE: 108 MMHG | RESPIRATION RATE: 20 BRPM | HEIGHT: 65 IN | WEIGHT: 170 LBS | BODY MASS INDEX: 28.32 KG/M2 | DIASTOLIC BLOOD PRESSURE: 74 MMHG | TEMPERATURE: 98.4 F | HEART RATE: 95 BPM | OXYGEN SATURATION: 99 %

## 2025-03-20 DIAGNOSIS — Z11.59 NEED FOR HEPATITIS C SCREENING TEST: ICD-10-CM

## 2025-03-20 DIAGNOSIS — Z23 NEED FOR TDAP VACCINATION: ICD-10-CM

## 2025-03-20 DIAGNOSIS — F31.61 BIPOLAR DISORDER, CURRENT EPISODE MIXED, MILD (H): ICD-10-CM

## 2025-03-20 DIAGNOSIS — F90.0 ATTENTION DEFICIT HYPERACTIVITY DISORDER (ADHD), PREDOMINANTLY INATTENTIVE TYPE: Primary | ICD-10-CM

## 2025-03-20 DIAGNOSIS — Z79.899 ENCOUNTER FOR LONG-TERM (CURRENT) USE OF MEDICATIONS: ICD-10-CM

## 2025-03-20 RX ORDER — ESCITALOPRAM OXALATE 10 MG/1
10 TABLET ORAL DAILY
Qty: 30 TABLET | Refills: 1 | Status: SHIPPED | OUTPATIENT
Start: 2025-03-20

## 2025-03-20 RX ORDER — ATOMOXETINE 40 MG/1
40 CAPSULE ORAL DAILY
COMMUNITY

## 2025-03-20 RX ORDER — LAMOTRIGINE 100 MG/1
100 TABLET ORAL 2 TIMES DAILY
Qty: 60 TABLET | Refills: 1 | Status: SHIPPED | OUTPATIENT
Start: 2025-03-20

## 2025-03-20 RX ORDER — ATOMOXETINE 25 MG/1
25 CAPSULE ORAL DAILY
Qty: 30 CAPSULE | Refills: 1 | Status: SHIPPED | OUTPATIENT
Start: 2025-03-20

## 2025-03-20 ASSESSMENT — ANXIETY QUESTIONNAIRES
GAD7 TOTAL SCORE: 12
5. BEING SO RESTLESS THAT IT IS HARD TO SIT STILL: MORE THAN HALF THE DAYS
3. WORRYING TOO MUCH ABOUT DIFFERENT THINGS: MORE THAN HALF THE DAYS
GAD7 TOTAL SCORE: 12
6. BECOMING EASILY ANNOYED OR IRRITABLE: MORE THAN HALF THE DAYS
7. FEELING AFRAID AS IF SOMETHING AWFUL MIGHT HAPPEN: MORE THAN HALF THE DAYS
GAD7 TOTAL SCORE: 12
7. FEELING AFRAID AS IF SOMETHING AWFUL MIGHT HAPPEN: MORE THAN HALF THE DAYS
4. TROUBLE RELAXING: MORE THAN HALF THE DAYS
8. IF YOU CHECKED OFF ANY PROBLEMS, HOW DIFFICULT HAVE THESE MADE IT FOR YOU TO DO YOUR WORK, TAKE CARE OF THINGS AT HOME, OR GET ALONG WITH OTHER PEOPLE?: SOMEWHAT DIFFICULT
1. FEELING NERVOUS, ANXIOUS, OR ON EDGE: SEVERAL DAYS
IF YOU CHECKED OFF ANY PROBLEMS ON THIS QUESTIONNAIRE, HOW DIFFICULT HAVE THESE PROBLEMS MADE IT FOR YOU TO DO YOUR WORK, TAKE CARE OF THINGS AT HOME, OR GET ALONG WITH OTHER PEOPLE: SOMEWHAT DIFFICULT
2. NOT BEING ABLE TO STOP OR CONTROL WORRYING: SEVERAL DAYS

## 2025-03-20 ASSESSMENT — PATIENT HEALTH QUESTIONNAIRE - PHQ9
SUM OF ALL RESPONSES TO PHQ QUESTIONS 1-9: 9
10. IF YOU CHECKED OFF ANY PROBLEMS, HOW DIFFICULT HAVE THESE PROBLEMS MADE IT FOR YOU TO DO YOUR WORK, TAKE CARE OF THINGS AT HOME, OR GET ALONG WITH OTHER PEOPLE: SOMEWHAT DIFFICULT
SUM OF ALL RESPONSES TO PHQ QUESTIONS 1-9: 9

## 2025-03-20 NOTE — PROGRESS NOTES
Assessment & Plan     Attention deficit hyperactivity disorder (ADHD), predominantly inattentive type  She found Concerta too strong and made her anxiety and such worse.  Strattera has worked well but she felt like the 40 mg dose was too high.  Would like to try a lower dose  She was seeing a provider who retired and they referred her to a new provider.  Which she understands is a new provider wanted her to be seen by a primary care provider before she would continue to follow her.  She is going to call and make an appointment ongoing for care and for medication  - atomoxetine (STRATTERA) 25 MG capsule; Take 1 capsule (25 mg) by mouth daily.  - CBC with platelets and differential; Future  - Comprehensive metabolic panel (BMP + Alb, Alk Phos, ALT, AST, Total. Bili, TP); Future  - TSH with free T4 reflex; Future  - Lipid panel reflex to direct LDL Fasting; Future    Bipolar disorder, current episode mixed, mild (H)  I do not usually follow bipolar disorder and like to have mental health following them for medications.  She is going to call to see about making appointment for ongoing care with a psychiatrist she was referred to  - escitalopram (LEXAPRO) 10 MG tablet; Take 1 tablet (10 mg) by mouth daily.  - lamoTRIgine (LAMICTAL) 100 MG tablet; Take 1 tablet (100 mg) by mouth 2 times daily.    Encounter for long-term (current) use of medications    - CBC with platelets and differential; Future  - Comprehensive metabolic panel (BMP + Alb, Alk Phos, ALT, AST, Total. Bili, TP); Future  - TSH with free T4 reflex; Future  - Lipid panel reflex to direct LDL Fasting; Future    Need for Tdap vaccination  She will do in a month when she has her insurance in place  - TDAP 10-64Y (ADACEL,BOOSTRIX); Future    Need for hepatitis C screening test    - Hepatitis C Screen Reflex to HCV RNA Quant and Genotype; Future      33 minutes spent by me on the date of the encounter doing chart review, history and exam, documentation and  "further activities per the note      BMI  Estimated body mass index is 28.73 kg/m  as calculated from the following:    Height as of this encounter: 1.638 m (5' 4.5\").    Weight as of this encounter: 77.1 kg (170 lb).         Patient Instructions   Lab appointment once insurance in place     Nurse only for TDAP - could do same day     Change straterra to 25 mg daily     Medication refilled     Psychiatry Esme Carrillo at [facility]    [phone]    Pepcid twice daily     LARY Lucas   Marcela is a 30 year old, presenting for the following health issues:  ANY, A.D.H.D, and New Patient      3/20/2025     8:55 AM   Additional Questions   Roomed by RULA Parrish LPN   Accompanied by self         3/20/2025     8:55 AM   Patient Reported Additional Medications   Patient reports taking the following new medications none     A.D.H.D    History of Present Illness       Reason for visit:  Requirement She is missing 2 dose(s) of medications per week.  She is not taking prescribed medications regularly due to cost of medication.        Taken off concerta - causing anxiety attack and depression     Straterra working well at current dose 40 mg   Feels it may be a little high   Would like to try a lower dose  Her previous mental health provider retired and she was referred to a new provider  , And that provider told her she had a see a primary for her to continue to give her medication  That is why she is here today and she is not quite sure why that is     we will refill her medicine So she has a couple months to get reestablished with her mental health provider.  Her insurance kicks in in a month so that will make life easier  Did discuss good Rx as a possibility for her to get her medications in the meantime        Review of Systems  Constitutional, neuro, ENT, endocrine, pulmonary, cardiac, gastrointestinal, genitourinary, musculoskeletal, integument and psychiatric systems are negative, except as otherwise noted.    " "  Objective    /74   Pulse 95   Temp 98.4  F (36.9  C) (Oral)   Resp 20   Ht 1.638 m (5' 4.5\")   Wt 77.1 kg (170 lb)   LMP 03/06/2025   SpO2 99%   Breastfeeding No   BMI 28.73 kg/m    Body mass index is 28.73 kg/m .  Physical Exam   GENERAL: alert and no distress  RESP: lungs clear to auscultation - no rales, rhonchi or wheezes  CV: regular rate and rhythm, normal S1 S2, no S3 or S4, no murmur, click or rub, no peripheral edema  ABDOMEN: soft, nontender, no hepatosplenomegaly, no masses and bowel sounds normal  MS: no gross musculoskeletal defects noted, no edema  NEURO: Normal strength and tone, mentation intact and speech normal  PSYCH: mentation appears normal, affect normal/bright    Lab work will be done in a month and her insurance is in place        Signed Electronically by: DONN Campos CNP    "

## 2025-03-20 NOTE — PATIENT INSTRUCTIONS
Lab appointment once insurance in place     Nurse only for TDAP - could do same day     Change straterra to 25 mg daily     Medication refilled     Psychiatry Esme Carrillo at [facility]    [phone]    Pepcid twice daily     LARY

## 2025-04-29 DIAGNOSIS — F90.0 ATTENTION DEFICIT HYPERACTIVITY DISORDER (ADHD), PREDOMINANTLY INATTENTIVE TYPE: ICD-10-CM

## 2025-04-29 DIAGNOSIS — F31.61 BIPOLAR DISORDER, CURRENT EPISODE MIXED, MILD (H): ICD-10-CM

## 2025-04-29 RX ORDER — ESCITALOPRAM OXALATE 10 MG/1
10 TABLET ORAL DAILY
Qty: 30 TABLET | Refills: 1 | Status: SHIPPED | OUTPATIENT
Start: 2025-04-29

## 2025-04-29 RX ORDER — ATOMOXETINE 25 MG/1
CAPSULE ORAL
Qty: 30 CAPSULE | Refills: 1 | Status: SHIPPED | OUTPATIENT
Start: 2025-04-29

## 2025-05-13 ENCOUNTER — VIRTUAL VISIT (OUTPATIENT)
Dept: INTERNAL MEDICINE | Facility: CLINIC | Age: 30
End: 2025-05-13

## 2025-05-13 DIAGNOSIS — F90.0 ATTENTION DEFICIT HYPERACTIVITY DISORDER (ADHD), PREDOMINANTLY INATTENTIVE TYPE: ICD-10-CM

## 2025-05-13 DIAGNOSIS — F31.61 BIPOLAR DISORDER, CURRENT EPISODE MIXED, MILD (H): ICD-10-CM

## 2025-05-13 PROCEDURE — 98006 SYNCH AUDIO-VIDEO EST MOD 30: CPT | Performed by: NURSE PRACTITIONER

## 2025-05-13 RX ORDER — ESCITALOPRAM OXALATE 10 MG/1
10 TABLET ORAL DAILY
Qty: 30 TABLET | Refills: 5 | Status: SHIPPED | OUTPATIENT
Start: 2025-05-13

## 2025-05-13 RX ORDER — ATOMOXETINE 25 MG/1
25 CAPSULE ORAL DAILY
Qty: 30 CAPSULE | Refills: 5 | Status: SHIPPED | OUTPATIENT
Start: 2025-05-13

## 2025-05-13 NOTE — PROGRESS NOTES
"Marcela is a 30 year old who is being evaluated via a billable video visit.    How would you like to obtain your AVS? MyChart  If the video visit is dropped, the invitation should be resent by: Text to cell phone: [phone]  Will anyone else be joining your video visit? No      Assessment & Plan     Attention deficit hyperactivity disorder (ADHD), predominantly inattentive type  Doing well on current dose   - atomoxetine (STRATTERA) 25 MG capsule; Take 1 capsule (25 mg) by mouth daily.    Bipolar disorder, current episode mixed, mild (H)  stable on medication   - escitalopram (LEXAPRO) 10 MG tablet; Take 1 tablet (10 mg) by mouth daily.          BMI  Estimated body mass index is 28.73 kg/m  as calculated from the following:    Height as of 3/20/25: 1.638 m (5' 4.5\").    Weight as of 3/20/25: 77.1 kg (170 lb).             Subjective   Marcela is a 30 year old, presenting for the following health issues:  Refill Request        3/20/2025     8:55 AM   Additional Questions   Roomed by RULA Parrish LPN   Accompanied by self     History of Present Illness       Reason for visit:  Follow up meds             9/20/2021     3:38 PM 7/27/2023    10:47 AM 3/20/2025     8:20 AM   PHQ   PHQ-9 Total Score 3 5 9    Q9: Thoughts of better off dead/self-harm past 2 weeks Not at all Not at all  Not at all       Patient-reported    Proxy-reported         9/20/2021     3:38 PM 5/2/2024    10:20 AM 3/20/2025     8:21 AM   JAISON-7 SCORE   Total Score  3 (minimal anxiety) 12 (moderate anxiety)   Total Score 1 3 12        Patient-reported     Per note 3/2025  She found Concerta too strong and made her anxiety and such worse.  Strattera has worked well but she felt like the 40 mg dose was too high.  Would like to try a lower dose  She was seeing a provider who retired and they referred her to a new provider.  Which she understands is a new provider wanted her to be seen by a primary care provider before she would continue to follow her.  She is " "going to call and make an appointment ongoing for care and for medication    Tried 425  Tried 448    Neurology for lamotrigine    Nexplanon tomorrow planned          Review of Systems  Constitutional, neuro, ENT, endocrine, pulmonary, cardiac, gastrointestinal, genitourinary, musculoskeletal, integument and psychiatric systems are negative, except as otherwise noted.      Objective    Vitals - Patient Reported  Weight (Patient Reported): 68 kg (150 lb)  Height (Patient Reported): 162.6 cm (5' 4\")  BMI (Based on Pt Reported Ht/Wt): 25.75        Physical Exam   GENERAL: alert and no distress  EYES: Eyes grossly normal to inspection.  No discharge or erythema, or obvious scleral/conjunctival abnormalities.  RESP: No audible wheeze, cough, or visible cyanosis.    SKIN: Visible skin clear. No significant rash, abnormal pigmentation or lesions.  NEURO: Cranial nerves grossly intact.  Mentation and speech appropriate for age.  PSYCH: Appropriate affect, tone, and pace of words          Video-Visit Details    Type of service:  Video Visit   Originating Location (pt. Location): Other riding in car    Distant Location (provider location):  On-site  Platform used for Video Visit: Christi  Signed Electronically by: DONN Campos CNP    " Detail Level: Detailed Quality 226: Preventive Care And Screening: Tobacco Use: Screening And Cessation Intervention: Patient screened for tobacco use and is an ex/non-smoker

## 2025-05-27 DIAGNOSIS — F31.61 BIPOLAR DISORDER, CURRENT EPISODE MIXED, MILD (H): ICD-10-CM

## 2025-05-28 RX ORDER — LAMOTRIGINE 100 MG/1
100 TABLET ORAL 2 TIMES DAILY
Qty: 60 TABLET | Refills: 5 | Status: SHIPPED | OUTPATIENT
Start: 2025-05-28

## 2025-06-09 ENCOUNTER — MYC REFILL (OUTPATIENT)
Dept: INTERNAL MEDICINE | Facility: CLINIC | Age: 30
End: 2025-06-09

## 2025-06-09 DIAGNOSIS — F90.0 ATTENTION DEFICIT HYPERACTIVITY DISORDER (ADHD), PREDOMINANTLY INATTENTIVE TYPE: ICD-10-CM

## 2025-06-09 DIAGNOSIS — F31.61 BIPOLAR DISORDER, CURRENT EPISODE MIXED, MILD (H): ICD-10-CM

## 2025-06-09 RX ORDER — ATOMOXETINE 25 MG/1
25 CAPSULE ORAL DAILY
Qty: 30 CAPSULE | Refills: 5 | OUTPATIENT
Start: 2025-06-09

## 2025-06-09 RX ORDER — ESCITALOPRAM OXALATE 10 MG/1
10 TABLET ORAL DAILY
Qty: 30 TABLET | Refills: 5 | OUTPATIENT
Start: 2025-06-09

## 2025-06-10 ENCOUNTER — VIRTUAL VISIT (OUTPATIENT)
Dept: INTERNAL MEDICINE | Facility: CLINIC | Age: 30
End: 2025-06-10
Payer: COMMERCIAL

## 2025-06-10 DIAGNOSIS — F31.61 BIPOLAR DISORDER, CURRENT EPISODE MIXED, MILD (H): ICD-10-CM

## 2025-06-10 DIAGNOSIS — G40.909 SEIZURE DISORDER (H): Primary | ICD-10-CM

## 2025-06-10 DIAGNOSIS — F90.0 ATTENTION DEFICIT HYPERACTIVITY DISORDER (ADHD), PREDOMINANTLY INATTENTIVE TYPE: ICD-10-CM

## 2025-06-10 PROCEDURE — 98006 SYNCH AUDIO-VIDEO EST MOD 30: CPT | Performed by: NURSE PRACTITIONER

## 2025-06-10 NOTE — PROGRESS NOTES
"Marcela is a 30 year old who is being evaluated via a billable video visit.    How would you like to obtain your AVS? MyChart  If the video visit is dropped, the invitation should be resent by: Text to cell phone: [phone]  Will anyone else be joining your video visit? No (Boyfriend may be in the room.)      Assessment & Plan     Seizure disorder (H)  She is not sure about whether she has a seizure disorder or not- needs to confirm with neurology   She said she had a seizure age 6 and none since but has been on lamictal since   She said 1 year ago had EEG that showed seizure activity and she is unsure of where that was done     - Adult Neurology  Referral; Future    Bipolar disorder, current episode mixed, mild (H)  Medication working well   - Adult Neurology  Referral; Future    ADHD   Medication working well       BMI  Estimated body mass index is 28.73 kg/m  as calculated from the following:    Height as of 3/20/25: 1.638 m (5' 4.5\").    Weight as of 3/20/25: 77.1 kg (170 lb).     Patient Instructions   Neurology referral     Medication refills are at pharmacy               Subjective   Marcela is a 30 year old, presenting for the following health issues:  RECHECK and Refill Request      6/10/2025     1:36 PM   Additional Questions   Roomed by RULA Parrish LPN   Accompanied by boyfriend         6/10/2025     1:36 PM   Patient Reported Additional Medications   Patient reports taking the following new medications none     History of Present Illness       Reason for visit:  Need perscription refill   She is taking medications regularly.        Lamictal - for seizures?   Bipolar depression     Seizure at age 6   No seizure since age 6   Last EEG showed seizure activity 1 year ago       Review of Systems  Constitutional, neuro, ENT, endocrine, pulmonary, cardiac, gastrointestinal, genitourinary, musculoskeletal, integument and psychiatric systems are negative, except as otherwise noted.      Objective  " "  Vitals - Patient Reported  Weight (Patient Reported): 63.5 kg (140 lb)  Height (Patient Reported): 162.6 cm (5' 4\")  BMI (Based on Pt Reported Ht/Wt): 24.03        Physical Exam   GENERAL: alert and no distress  EYES: Eyes grossly normal to inspection.  No discharge or erythema, or obvious scleral/conjunctival abnormalities.  RESP: No audible wheeze, cough, or visible cyanosis.    SKIN: Visible skin clear. No significant rash, abnormal pigmentation or lesions.  NEURO: Cranial nerves grossly intact.  Mentation and speech appropriate for age.  PSYCH: Appropriate affect, tone, and pace of words          Video-Visit Details    Type of service:  Video Visit   Originating Location (pt. Location): Home    Distant Location (provider location):  On-site  Platform used for Video Visit: Christi  Signed Electronically by: DONN Campos CNP    "

## 2025-06-11 ENCOUNTER — PATIENT OUTREACH (OUTPATIENT)
Dept: CARE COORDINATION | Facility: CLINIC | Age: 30
End: 2025-06-11

## 2025-06-14 ENCOUNTER — HEALTH MAINTENANCE LETTER (OUTPATIENT)
Age: 30
End: 2025-06-14

## 2025-07-20 ENCOUNTER — MYC REFILL (OUTPATIENT)
Dept: INTERNAL MEDICINE | Facility: CLINIC | Age: 30
End: 2025-07-20
Payer: COMMERCIAL

## 2025-07-20 DIAGNOSIS — F31.61 BIPOLAR DISORDER, CURRENT EPISODE MIXED, MILD (H): ICD-10-CM

## 2025-07-21 RX ORDER — LAMOTRIGINE 100 MG/1
100 TABLET ORAL 2 TIMES DAILY
Qty: 60 TABLET | Refills: 5 | OUTPATIENT
Start: 2025-07-21

## 2025-08-05 ENCOUNTER — MYC REFILL (OUTPATIENT)
Dept: INTERNAL MEDICINE | Facility: CLINIC | Age: 30
End: 2025-08-05
Payer: COMMERCIAL

## 2025-08-05 DIAGNOSIS — F31.61 BIPOLAR DISORDER, CURRENT EPISODE MIXED, MILD (H): ICD-10-CM

## 2025-08-06 RX ORDER — LAMOTRIGINE 100 MG/1
100 TABLET ORAL 2 TIMES DAILY
Qty: 60 TABLET | Refills: 5 | OUTPATIENT
Start: 2025-08-06

## 2025-08-11 ENCOUNTER — MYC MEDICAL ADVICE (OUTPATIENT)
Dept: INTERNAL MEDICINE | Facility: CLINIC | Age: 30
End: 2025-08-11
Payer: COMMERCIAL

## 2025-09-04 ENCOUNTER — E-VISIT (OUTPATIENT)
Dept: URGENT CARE | Facility: CLINIC | Age: 30
End: 2025-09-04
Payer: COMMERCIAL

## 2025-09-04 DIAGNOSIS — J06.9 VIRAL URI WITH COUGH: Primary | ICD-10-CM

## 2025-09-04 RX ORDER — GUAIFENESIN 1200 MG/1
1200 TABLET, EXTENDED RELEASE ORAL 2 TIMES DAILY
Qty: 60 TABLET | Refills: 0 | Status: SHIPPED | OUTPATIENT
Start: 2025-09-04

## 2025-09-04 RX ORDER — BENZONATATE 200 MG/1
200 CAPSULE ORAL 3 TIMES DAILY PRN
Qty: 30 CAPSULE | Refills: 0 | Status: SHIPPED | OUTPATIENT
Start: 2025-09-04